# Patient Record
Sex: MALE | Race: WHITE | NOT HISPANIC OR LATINO | Employment: OTHER | URBAN - METROPOLITAN AREA
[De-identification: names, ages, dates, MRNs, and addresses within clinical notes are randomized per-mention and may not be internally consistent; named-entity substitution may affect disease eponyms.]

---

## 2017-05-15 PROBLEM — I20.9 ANGINA, CLASS II: Status: ACTIVE | Noted: 2017-05-15

## 2018-01-19 PROBLEM — J44.1 COPD EXACERBATION: Status: ACTIVE | Noted: 2018-01-19

## 2018-01-19 PROBLEM — D62 ACUTE BLOOD LOSS ANEMIA: Status: ACTIVE | Noted: 2018-01-19

## 2018-01-19 PROBLEM — D64.9 SYMPTOMATIC ANEMIA: Status: ACTIVE | Noted: 2018-01-19

## 2018-01-19 PROBLEM — I25.10 ASCVD (ARTERIOSCLEROTIC CARDIOVASCULAR DISEASE): Status: ACTIVE | Noted: 2018-01-19

## 2018-01-20 PROBLEM — K92.1 MELENA: Status: ACTIVE | Noted: 2018-01-20

## 2018-02-08 PROBLEM — K31.811 DUODENAL HEMORRHAGE DUE TO ANGIODYSPLASIA OF DUODENUM: Status: ACTIVE | Noted: 2018-02-08

## 2018-02-22 PROBLEM — J44.9 COPD (CHRONIC OBSTRUCTIVE PULMONARY DISEASE): Status: ACTIVE | Noted: 2018-01-19

## 2018-03-14 PROBLEM — R00.1 BRADYCARDIA: Status: ACTIVE | Noted: 2018-03-14

## 2018-03-14 PROBLEM — I45.2 BIFASCICULAR BUNDLE BRANCH BLOCK: Status: ACTIVE | Noted: 2018-03-14

## 2018-03-14 PROBLEM — I44.1 SECOND DEGREE AV BLOCK: Status: ACTIVE | Noted: 2018-03-14

## 2018-11-08 ENCOUNTER — OFFICE VISIT (OUTPATIENT)
Dept: OPTOMETRY | Facility: CLINIC | Age: 83
End: 2018-11-08
Payer: MEDICARE

## 2018-11-08 DIAGNOSIS — H52.13 MYOPIA OF BOTH EYES WITH REGULAR ASTIGMATISM: ICD-10-CM

## 2018-11-08 DIAGNOSIS — Z96.1 PSEUDOPHAKIA OF BOTH EYES: ICD-10-CM

## 2018-11-08 DIAGNOSIS — Z13.5 GLAUCOMA SCREENING: ICD-10-CM

## 2018-11-08 DIAGNOSIS — H35.30 AMD (AGE RELATED MACULAR DEGENERATION): Primary | ICD-10-CM

## 2018-11-08 DIAGNOSIS — H52.223 MYOPIA OF BOTH EYES WITH REGULAR ASTIGMATISM: ICD-10-CM

## 2018-11-08 PROCEDURE — 92004 COMPRE OPH EXAM NEW PT 1/>: CPT | Mod: S$PBB,,, | Performed by: OPTOMETRIST

## 2018-11-08 PROCEDURE — 99999 PR PBB SHADOW E&M-EST. PATIENT-LVL III: CPT | Mod: PBBFAC,,, | Performed by: OPTOMETRIST

## 2018-11-08 PROCEDURE — 99213 OFFICE O/P EST LOW 20 MIN: CPT | Mod: PBBFAC | Performed by: OPTOMETRIST

## 2018-11-08 PROCEDURE — 92015 DETERMINE REFRACTIVE STATE: CPT | Mod: ,,, | Performed by: OPTOMETRIST

## 2018-11-08 NOTE — PROGRESS NOTES
HPI     New pt    Retina in [place]    Macula Degeneration.  Stopped AREDs 2/2 no improvement    Gtts: Systane prn OU    Patient comes in today with complaints of deem va.   Pt noticed vision changes 3 months ago. He denies any pain, no f/f.    Last edited by Greyson Phillips, OD on 11/8/2018  8:48 AM. (History)            Assessment /Plan     For exam results, see Encounter Report.    AMD (age related macular degeneration)  -     Ambulatory Referral to Ophthalmology  -Hx of injections, wishes to establish care retina [facility]  -restart AREDs vitamins BID    Pseudophakia of both eyes  -clear, centered    Glaucoma screening  -Monitor with annual eye exam and IOP check    Myopia of both eyes with regular astigmatism  Eyeglass Final Rx     Eyeglass Final Rx       Sphere Cylinder Axis Dist VA Add    Right -1.75 +0.75 025 20/70 +2.75    Left -0.50 +1.25 155 20/40-- +2.75    Type:  Bifocal    Expiration Date:  11/9/2019                  RTC 1 yr

## 2019-02-21 ENCOUNTER — OFFICE VISIT (OUTPATIENT)
Dept: URGENT CARE | Facility: CLINIC | Age: 84
End: 2019-02-21
Payer: MEDICARE

## 2019-02-21 VITALS
SYSTOLIC BLOOD PRESSURE: 126 MMHG | DIASTOLIC BLOOD PRESSURE: 64 MMHG | RESPIRATION RATE: 20 BRPM | HEART RATE: 83 BPM | HEIGHT: 70 IN | WEIGHT: 182 LBS | OXYGEN SATURATION: 99 % | TEMPERATURE: 97 F | BODY MASS INDEX: 26.05 KG/M2

## 2019-02-21 DIAGNOSIS — R06.02 SHORTNESS OF BREATH: Primary | ICD-10-CM

## 2019-02-21 PROCEDURE — 3074F PR MOST RECENT SYSTOLIC BLOOD PRESSURE < 130 MM HG: ICD-10-PCS | Mod: CPTII,S$GLB,, | Performed by: EMERGENCY MEDICINE

## 2019-02-21 PROCEDURE — 1101F PT FALLS ASSESS-DOCD LE1/YR: CPT | Mod: CPTII,S$GLB,, | Performed by: EMERGENCY MEDICINE

## 2019-02-21 PROCEDURE — 99214 OFFICE O/P EST MOD 30 MIN: CPT | Mod: S$GLB,,, | Performed by: EMERGENCY MEDICINE

## 2019-02-21 PROCEDURE — 3078F PR MOST RECENT DIASTOLIC BLOOD PRESSURE < 80 MM HG: ICD-10-PCS | Mod: CPTII,S$GLB,, | Performed by: EMERGENCY MEDICINE

## 2019-02-21 PROCEDURE — 3078F DIAST BP <80 MM HG: CPT | Mod: CPTII,S$GLB,, | Performed by: EMERGENCY MEDICINE

## 2019-02-21 PROCEDURE — 1101F PR PT FALLS ASSESS DOC 0-1 FALLS W/OUT INJ PAST YR: ICD-10-PCS | Mod: CPTII,S$GLB,, | Performed by: EMERGENCY MEDICINE

## 2019-02-21 PROCEDURE — 3074F SYST BP LT 130 MM HG: CPT | Mod: CPTII,S$GLB,, | Performed by: EMERGENCY MEDICINE

## 2019-02-21 PROCEDURE — 99214 PR OFFICE/OUTPT VISIT, EST, LEVL IV, 30-39 MIN: ICD-10-PCS | Mod: S$GLB,,, | Performed by: EMERGENCY MEDICINE

## 2019-02-21 RX ORDER — TRAZODONE HYDROCHLORIDE 100 MG/1
200 TABLET ORAL NIGHTLY
Refills: 3 | COMMUNITY
Start: 2019-02-05 | End: 2020-05-15 | Stop reason: SDUPTHER

## 2019-02-21 RX ORDER — AMLODIPINE BESYLATE 2.5 MG/1
TABLET ORAL
Status: ON HOLD | COMMUNITY
End: 2019-04-10 | Stop reason: HOSPADM

## 2019-02-21 RX ORDER — ALPRAZOLAM 0.5 MG/1
0.5 TABLET ORAL 2 TIMES DAILY
Refills: 0 | COMMUNITY
Start: 2019-02-20 | End: 2020-01-15

## 2019-02-21 RX ORDER — AMOXICILLIN 500 MG/1
500 CAPSULE ORAL 3 TIMES DAILY
Refills: 0 | Status: ON HOLD | COMMUNITY
Start: 2019-02-20 | End: 2019-04-10 | Stop reason: HOSPADM

## 2019-02-21 RX ORDER — DILTIAZEM HYDROCHLORIDE 60 MG/1
TABLET, FILM COATED ORAL
Status: ON HOLD | COMMUNITY
End: 2019-10-17 | Stop reason: CLARIF

## 2019-02-21 RX ORDER — CLINDAMYCIN PHOSPHATE 1 G/10ML
GEL TOPICAL
Status: ON HOLD | COMMUNITY
End: 2019-10-17 | Stop reason: CLARIF

## 2019-02-21 NOTE — PROGRESS NOTES
"Subjective:       Patient ID: Kemar Perez is a 84 y.o. male.    Vitals:  height is 5' 10" (1.778 m) and weight is 82.6 kg (182 lb). His oral temperature is 97 °F (36.1 °C). His blood pressure is 126/64 and his pulse is 83. His respiration is 20 and oxygen saturation is 99%.     Chief Complaint: Shortness of Breath    Pt states years long hx shortness of breath, occurs now when he wakes up in the morning, saw cardiology last week, cxr done 2 d ago clear, trying to make appt with pulmonology.      Shortness of Breath   This is a new problem. The current episode started more than 1 year ago. The problem occurs constantly. The problem has been unchanged. Associated symptoms include wheezing. Pertinent negatives include no ear pain, fever, hemoptysis, rash, sore throat, sputum production or vomiting. The symptoms are aggravated by any activity (when patient first wakes up). He has tried ipratropium inhalers (breathing treatments) for the symptoms. His past medical history is significant for COPD.       Constitution: Negative for chills, sweating, fatigue and fever.   HENT: Negative for ear pain, congestion, sinus pain, sinus pressure, sore throat and voice change.    Neck: Negative for painful lymph nodes.   Eyes: Negative for eye redness.   Respiratory: Positive for shortness of breath and wheezing. Negative for chest tightness, cough, sputum production, bloody sputum, COPD, stridor and asthma.    Gastrointestinal: Negative for nausea and vomiting.   Musculoskeletal: Negative for muscle ache.   Skin: Negative for rash.   Allergic/Immunologic: Negative for seasonal allergies and asthma.   Hematologic/Lymphatic: Negative for swollen lymph nodes.       Objective:      Physical Exam   Constitutional: He is oriented to person, place, and time. He appears well-developed and well-nourished.   HENT:   Head: Normocephalic and atraumatic.   Cardiovascular: Normal rate, regular rhythm and normal heart sounds. "   Pulmonary/Chest:   Diminished BS bilat posteriorly, no wheezing heard   Musculoskeletal: Normal range of motion.   Neurological: He is alert and oriented to person, place, and time.   Psychiatric: He has a normal mood and affect. His behavior is normal.       Assessment:       1. Shortness of breath        Plan:         Shortness of breath        Jax Hinojosa MD  Go to the Emergency Department for any problems  Call your PCP for follow up next available.

## 2019-02-21 NOTE — PATIENT INSTRUCTIONS
Follow up with your medical team.    Jax Hinojosa MD  Go to the Emergency Department for any problems  Call your PCP for follow up next available.    Shortness of Breath (Dyspnea)  Shortness of breath is the feeling that you can't catch your breath or get enough air. It is also known as dyspnea.  Dyspnea can be caused by many different conditions. They include:  · Acute asthma attack.  · Worsening of chronic lung diseases such as chronic bronchitis and emphysema.  · Heart failure. This is when weak heart muscle allows extra fluid to collect in the lungs.  · Panic attacks or anxiety. Fear can cause rapid breathing (hyperventilation).  · Pneumonia, or an infection in the lung tissue.  · Exposure to toxic substances, fumes, smoke, or certain medicines.  · Blood clot in the lung (pulmonary embolism). This is often from a piece of blood clot in a deep vein of the leg (deep vein thrombosis) that breaks off and travels to the lungs.  · Heart attack or heart-related chest pain (angina).  · Anemia.  · Collapsed lung (pneumothorax).  · Dehydration.  · Pregnancy.  Based on your visit today, the exact cause of your shortness of breath is not certain. Your tests dont show any of the serious causes of dyspnea. You may need other tests to find out if you have a serious problem. Its important to watch for any new symptoms or symptoms that get worse. Follow up with your healthcare provider as directed.  Home care  Follow these tips to take care of yourself at home:  · When your symptoms are better, go back to your usual activities.  · If you smoke, you should stop. Join a quit-smoking program or ask your healthcare provider for help.  · Eat a healthy diet and get plenty of sleep.  · Get regular exercise. Talk with your healthcare provider before starting to exercise, especially if you have other medical problems.  · Cut down on the amount of caffeine and stimulants you consume.  Follow-up care  Follow up with your healthcare  provider, or as advised.  If tests were done, you will be told if your treatment needs to be changed. You can call as directed for the results.  (Note: If an X-ray was taken, a specialist will review it. You will be notified of any new findings that may affect your care.)  Call 911 or get immediate medical care  Shortness of breath may be a sign of a serious medical problem. For example, it may be a problem with your heart or lungs. Call 911 if you have worsening shortness of breath or trouble breathing, especially with any of the symptoms below:  · You are confused or its difficult to wake you.  · You faint or lose consciousness.  · You have a fast heartbeat, or your heartbeat is irregular.  · You are coughing up blood.  · You have pain in your chest, arm, shoulder, neck, or upper back.  · You break out in a sweat.  When to seek medical advice  Call your healthcare provider right away if any of these occur:  · Slight shortness of breath or wheezing  · Redness, pain or swelling in your leg, arm, or other body area  · Swelling in both legs or ankles  · Fast weight gain  · Dizziness or weakness  · Fever of 100.4ºF (38ºC) or higher, or as directed by your healthcare provider  Date Last Reviewed: 9/13/2015  © 1353-4884 [employer]. [address] [place] [address], [place] [address]. All rights reserved. This information is not intended as a substitute for professional medical care. Always follow your healthcare professional's instructions.

## 2019-02-24 ENCOUNTER — TELEPHONE (OUTPATIENT)
Dept: URGENT CARE | Facility: CLINIC | Age: 84
End: 2019-02-24

## 2019-03-11 DIAGNOSIS — J44.89 OBSTRUCTIVE CHRONIC BRONCHITIS WITHOUT EXACERBATION: Primary | ICD-10-CM

## 2019-03-15 ENCOUNTER — HOSPITAL ENCOUNTER (OUTPATIENT)
Dept: PULMONOLOGY | Facility: HOSPITAL | Age: 84
Discharge: HOME | End: 2019-03-15
Attending: INTERNAL MEDICINE
Payer: MEDICARE

## 2019-03-15 DIAGNOSIS — J44.89 OBSTRUCTIVE CHRONIC BRONCHITIS WITHOUT EXACERBATION: ICD-10-CM

## 2019-03-15 PROCEDURE — 94729 DIFFUSING CAPACITY: CPT

## 2019-03-15 PROCEDURE — 94060 EVALUATION OF WHEEZING: CPT

## 2019-03-15 PROCEDURE — 99900031 HC PATIENT EDUCATION (STAT)

## 2019-03-15 PROCEDURE — 94727 GAS DIL/WSHOT DETER LNG VOL: CPT

## 2019-04-06 PROBLEM — I35.0 NONRHEUMATIC AORTIC VALVE STENOSIS: Status: ACTIVE | Noted: 2019-04-06

## 2019-04-24 PROBLEM — R53.83 FATIGUE: Status: ACTIVE | Noted: 2019-04-24

## 2019-04-24 PROBLEM — D64.9 ANEMIA: Status: ACTIVE | Noted: 2019-04-24

## 2019-04-25 PROBLEM — D50.0 IRON DEFICIENCY ANEMIA DUE TO CHRONIC BLOOD LOSS: Status: ACTIVE | Noted: 2019-04-25

## 2019-06-20 ENCOUNTER — HOSPITAL ENCOUNTER (EMERGENCY)
Facility: HOSPITAL | Age: 84
Discharge: HOME | End: 2019-06-20
Attending: SURGERY
Payer: MEDICARE

## 2019-06-20 VITALS
HEART RATE: 66 BPM | OXYGEN SATURATION: 100 % | RESPIRATION RATE: 20 BRPM | SYSTOLIC BLOOD PRESSURE: 166 MMHG | DIASTOLIC BLOOD PRESSURE: 66 MMHG | TEMPERATURE: 97 F

## 2019-06-20 DIAGNOSIS — R06.00 DYSPNEA: ICD-10-CM

## 2019-06-20 LAB
ALBUMIN SERPL BCP-MCNC: 3.4 G/DL (ref 3.5–5.2)
ALP SERPL-CCNC: 89 U/L (ref 55–135)
ALT SERPL W/O P-5'-P-CCNC: 13 U/L (ref 10–44)
ANION GAP SERPL CALC-SCNC: 6 MMOL/L (ref 8–16)
AST SERPL-CCNC: 11 U/L (ref 10–40)
BASOPHILS # BLD AUTO: 0.01 K/UL (ref 0–0.2)
BASOPHILS NFR BLD: 0.2 % (ref 0–1.9)
BILIRUB SERPL-MCNC: 0.3 MG/DL (ref 0.1–1)
BNP SERPL-MCNC: 509 PG/ML (ref 0–99)
BUN SERPL-MCNC: 12 MG/DL (ref 8–23)
CALCIUM SERPL-MCNC: 9.1 MG/DL (ref 8.7–10.5)
CHLORIDE SERPL-SCNC: 105 MMOL/L (ref 95–110)
CK MB SERPL-MCNC: 2.2 NG/ML (ref 0.1–6.5)
CK MB SERPL-RTO: 3.1 % (ref 0–5)
CK SERPL-CCNC: 70 U/L (ref 20–200)
CO2 SERPL-SCNC: 29 MMOL/L (ref 23–29)
CREAT SERPL-MCNC: 0.9 MG/DL (ref 0.5–1.4)
DIFFERENTIAL METHOD: ABNORMAL
EOSINOPHIL # BLD AUTO: 0.2 K/UL (ref 0–0.5)
EOSINOPHIL NFR BLD: 3.1 % (ref 0–8)
ERYTHROCYTE [DISTWIDTH] IN BLOOD BY AUTOMATED COUNT: 17.1 % (ref 11.5–14.5)
EST. GFR  (AFRICAN AMERICAN): >60 ML/MIN/1.73 M^2
EST. GFR  (NON AFRICAN AMERICAN): >60 ML/MIN/1.73 M^2
GLUCOSE SERPL-MCNC: 105 MG/DL (ref 70–110)
HCT VFR BLD AUTO: 32.2 % (ref 40–54)
HGB BLD-MCNC: 9 G/DL (ref 14–18)
LYMPHOCYTES # BLD AUTO: 1 K/UL (ref 1–4.8)
LYMPHOCYTES NFR BLD: 16.3 % (ref 18–48)
MCH RBC QN AUTO: 22.4 PG (ref 27–31)
MCHC RBC AUTO-ENTMCNC: 28 G/DL (ref 32–36)
MCV RBC AUTO: 80 FL (ref 82–98)
MONOCYTES # BLD AUTO: 0.5 K/UL (ref 0.3–1)
MONOCYTES NFR BLD: 7.8 % (ref 4–15)
NEUTROPHILS # BLD AUTO: 4.7 K/UL (ref 1.8–7.7)
NEUTROPHILS NFR BLD: 72.8 % (ref 38–73)
PLATELET # BLD AUTO: 253 K/UL (ref 150–350)
PMV BLD AUTO: 11.4 FL (ref 9.2–12.9)
POTASSIUM SERPL-SCNC: 4.3 MMOL/L (ref 3.5–5.1)
PROT SERPL-MCNC: 6.5 G/DL (ref 6–8.4)
RBC # BLD AUTO: 4.02 M/UL (ref 4.6–6.2)
SODIUM SERPL-SCNC: 140 MMOL/L (ref 136–145)
TROPONIN I SERPL DL<=0.01 NG/ML-MCNC: 0.01 NG/ML (ref 0–0.03)
WBC # BLD AUTO: 6.4 K/UL (ref 3.9–12.7)

## 2019-06-20 PROCEDURE — 93010 EKG 12-LEAD: ICD-10-PCS | Mod: ,,, | Performed by: INTERNAL MEDICINE

## 2019-06-20 PROCEDURE — 82550 ASSAY OF CK (CPK): CPT

## 2019-06-20 PROCEDURE — 93005 ELECTROCARDIOGRAM TRACING: CPT

## 2019-06-20 PROCEDURE — 93010 ELECTROCARDIOGRAM REPORT: CPT | Mod: ,,, | Performed by: INTERNAL MEDICINE

## 2019-06-20 PROCEDURE — 82553 CREATINE MB FRACTION: CPT

## 2019-06-20 PROCEDURE — 99285 EMERGENCY DEPT VISIT HI MDM: CPT | Mod: 25

## 2019-06-20 PROCEDURE — 85025 COMPLETE CBC W/AUTO DIFF WBC: CPT

## 2019-06-20 PROCEDURE — 80053 COMPREHEN METABOLIC PANEL: CPT

## 2019-06-20 PROCEDURE — 84484 ASSAY OF TROPONIN QUANT: CPT

## 2019-06-20 PROCEDURE — 94640 AIRWAY INHALATION TREATMENT: CPT

## 2019-06-20 PROCEDURE — 83880 ASSAY OF NATRIURETIC PEPTIDE: CPT

## 2019-06-20 PROCEDURE — 96374 THER/PROPH/DIAG INJ IV PUSH: CPT

## 2019-06-20 PROCEDURE — 63600175 PHARM REV CODE 636 W HCPCS: Performed by: SURGERY

## 2019-06-20 PROCEDURE — 25000242 PHARM REV CODE 250 ALT 637 W/ HCPCS: Performed by: SURGERY

## 2019-06-20 RX ORDER — IPRATROPIUM BROMIDE AND ALBUTEROL SULFATE 2.5; .5 MG/3ML; MG/3ML
3 SOLUTION RESPIRATORY (INHALATION) EVERY 4 HOURS
Status: DISCONTINUED | OUTPATIENT
Start: 2019-06-20 | End: 2019-06-20 | Stop reason: HOSPADM

## 2019-06-20 RX ORDER — FUROSEMIDE 10 MG/ML
40 INJECTION INTRAMUSCULAR; INTRAVENOUS
Status: COMPLETED | OUTPATIENT
Start: 2019-06-20 | End: 2019-06-20

## 2019-06-20 RX ORDER — FUROSEMIDE 10 MG/ML
40 INJECTION INTRAMUSCULAR; INTRAVENOUS
Status: DISCONTINUED | OUTPATIENT
Start: 2019-06-20 | End: 2019-06-20

## 2019-06-20 RX ORDER — FUROSEMIDE 20 MG/1
20 TABLET ORAL DAILY
Qty: 15 TABLET | Refills: 0 | Status: ON HOLD | OUTPATIENT
Start: 2019-06-20 | End: 2019-10-19 | Stop reason: HOSPADM

## 2019-06-20 RX ADMIN — FUROSEMIDE 40 MG: 10 INJECTION, SOLUTION INTRAMUSCULAR; INTRAVENOUS at 10:06

## 2019-06-20 RX ADMIN — IPRATROPIUM BROMIDE AND ALBUTEROL SULFATE 3 ML: .5; 3 SOLUTION RESPIRATORY (INHALATION) at 11:06

## 2019-06-20 NOTE — ED PROVIDER NOTES
Encounter Date: 6/20/2019       History     Chief Complaint   Patient presents with    Shortness of Breath     Patient is 84-year-old white male with chronic symptomatic iron deficiency anemia.  Extensive workup to include GI Medicine has failed to identify source of his chronic anemia.  He feels short of breath today, similar to what happens when his hemoglobin is decreased to a certain level.  He presents to have blood work for evaluation and to determine if he might need transfusion, which has happened in the past.  He denies chest pain. He denies coughing.        Review of patient's allergies indicates:   Allergen Reactions    Dexamethasone Other (See Comments)     High blood pressure    Mobic [meloxicam] Other (See Comments)     Bleeds easily    Nsaids (non-steroidal anti-inflammatory drug) Other (See Comments)     Bleeding     Spiriva respimat [tiotropium bromide] Other (See Comments)     Dry mouth     Past Medical History:   Diagnosis Date    Adenomatous polyps     Anemia     Aortic stenosis     Arthritis     AS (aortic stenosis)     Asthma     BBB (bundle branch block)     Cataract     COPD (chronic obstructive pulmonary disease)     Coronary artery disease     Encounter for blood transfusion     GERD (gastroesophageal reflux disease)     GI bleed     H/O food poisoning     H/O: GI bleed     Heart block AV second degree     Hemorrhoids     History of shingles     History of stomach ulcers     HLD (hyperlipidemia)     Hx of migraines     Hx of migraines     Hypertension     Macular degeneration of right eye     PAF (paroxysmal atrial fibrillation)     Pneumonia      Past Surgical History:   Procedure Laterality Date    ANGIOGRAM-CORONARY N/A 5/15/2017    [facility]    CARDIAC ANGIOGRAM WITH STENTS      CARDIAC PACEMAKER PLACEMENT      CATARACT EXTRACTION      CATARACT SX Bilateral     COLONOSCOPY N/A 4/9/2019    Performed by Sebastien ATKINSON  MD [facility] at [facility]    CORONARY ARTERY BYPASS GRAFT      CTR Right     EGD (ESOPHAGOGASTRODUODENOSCOPY) N/A 2019    Per[facility]orthy [facility] at [facility]    ESOPHAGOGASTRODUODENOSCOPY (EGD) N/A 2018    Per[facility] at [facility]    ESOPHAGOGASTRODUODENOSCOPY (EGD) N/A 2018    Performed by [facility] at [facility]    Insertion-Pacemaker N/A 3/14/2018    Per[facility] Denys Macario [facility] at [facility]    JOINT REPLACEMENT      TOTAL KNEE ARTHROPLASTY Right     WRIST JOINT REMOVAL Right      Family History   Problem Relation Age of Onset    Heart disease Paternal Grandmother     Hypertension Paternal Grandmother     Heart disease Paternal Grandfather     Hypertension Paternal Grandfather     Heart disease Father     Hypertension Father      Social History     Tobacco Use    Smoking status: Former Smoker     Packs/day: 1.00     Types: Cigarettes     Start date:      Last attempt to quit:      Years since quittin.4    Smokeless tobacco: Never Used   Substance Use Topics    Alcohol use: No    Drug use: No     Review of Systems   Constitutional: Negative for fever.   HENT: Negative for sore throat.    Respiratory: Positive for shortness of breath. Negative for cough.    Cardiovascular: Negative for chest pain.   Gastrointestinal: Negative for nausea.   Genitourinary: Negative for dysuria.   Musculoskeletal: Negative for back pain.   Skin: Negative for rash.   Neurological: Negative for weakness.   Hematological: Does not bruise/bleed easily.       Physical Exam     Initial Vitals [19 0837]   BP Pulse Resp Temp SpO2   122/63 76 20 96.5 °F (35.8 °C) (!) 94 %      MAP       --         Physical Exam    Nursing note and vitals reviewed.  Constitutional: He appears well-developed and well-nourished.   HENT:   Head: Normocephalic and atraumatic.   Eyes: EOM are normal. Pupils are equal, round, and reactive to light.    Neck: Normal range of motion. Neck supple.   Cardiovascular: Normal rate and normal heart sounds.   Pulmonary/Chest: Breath sounds normal. He exhibits no tenderness.   Abdominal: Soft. He exhibits no distension and no mass. There is no tenderness.   Musculoskeletal: Normal range of motion.   Neurological: He is alert and oriented to person, place, and time.   Skin: Skin is warm and dry.   Psychiatric: He has a normal mood and affect. Thought content normal.         ED Course   Procedures  Labs Reviewed - No data to display       Imaging Results    None        Hct 32.  Cardiac enzymes NEGATIVE.   EKG  Unchanged since prior EKG in April.                       Clinical Impression:       ICD-10-CM ICD-9-CM   1. Dyspnea R06.00 786.09         Disposition:   Disposition: Discharged  Condition: Stable                        Jair Adam Jr., MD  06/20/19 5030

## 2019-06-20 NOTE — ED TRIAGE NOTES
"85 yo male presents to ED for shortness of breath, progressive onset 1 week.  Patient reports "when my blood gets low, I get really short of breath".  Patient on home O2 at 2LPM by nasal canula.  "

## 2019-08-27 ENCOUNTER — LAB VISIT (OUTPATIENT)
Dept: LAB | Facility: HOSPITAL | Age: 84
End: 2019-08-27
Attending: INTERNAL MEDICINE
Payer: MEDICARE

## 2019-08-27 DIAGNOSIS — I50.32 CHRONIC DIASTOLIC HEART FAILURE: Primary | ICD-10-CM

## 2019-08-27 LAB
ALBUMIN SERPL BCP-MCNC: 3.9 G/DL (ref 3.5–5.2)
ALP SERPL-CCNC: 77 U/L (ref 55–135)
ALT SERPL W/O P-5'-P-CCNC: 11 U/L (ref 10–44)
ANION GAP SERPL CALC-SCNC: 14 MMOL/L (ref 8–16)
AST SERPL-CCNC: 19 U/L (ref 10–40)
BASOPHILS # BLD AUTO: 0.01 K/UL (ref 0–0.2)
BASOPHILS NFR BLD: 0.1 % (ref 0–1.9)
BILIRUB DIRECT SERPL-MCNC: 0.2 MG/DL (ref 0.1–0.3)
BILIRUB SERPL-MCNC: 0.5 MG/DL (ref 0.1–1)
BNP SERPL-MCNC: 98 PG/ML (ref 0–99)
BUN SERPL-MCNC: 32 MG/DL (ref 8–23)
CALCIUM SERPL-MCNC: 9.6 MG/DL (ref 8.7–10.5)
CHLORIDE SERPL-SCNC: 96 MMOL/L (ref 95–110)
CHOLEST SERPL-MCNC: 167 MG/DL (ref 120–199)
CHOLEST/HDLC SERPL: 2.7 {RATIO} (ref 2–5)
CO2 SERPL-SCNC: 30 MMOL/L (ref 23–29)
CREAT SERPL-MCNC: 1.3 MG/DL (ref 0.5–1.4)
DIFFERENTIAL METHOD: ABNORMAL
EOSINOPHIL # BLD AUTO: 0.3 K/UL (ref 0–0.5)
EOSINOPHIL NFR BLD: 4.2 % (ref 0–8)
ERYTHROCYTE [DISTWIDTH] IN BLOOD BY AUTOMATED COUNT: 15.5 % (ref 11.5–14.5)
EST. GFR  (AFRICAN AMERICAN): 58 ML/MIN/1.73 M^2
EST. GFR  (NON AFRICAN AMERICAN): 50 ML/MIN/1.73 M^2
GLUCOSE SERPL-MCNC: 111 MG/DL (ref 70–110)
HCT VFR BLD AUTO: 34.7 % (ref 40–54)
HDLC SERPL-MCNC: 63 MG/DL (ref 40–75)
HDLC SERPL: 37.7 % (ref 20–50)
HGB BLD-MCNC: 10.4 G/DL (ref 14–18)
IMM GRANULOCYTES # BLD AUTO: 0.03 K/UL (ref 0–0.04)
IMM GRANULOCYTES NFR BLD AUTO: 0.4 % (ref 0–0.5)
LDLC SERPL CALC-MCNC: 93.8 MG/DL (ref 63–159)
LYMPHOCYTES # BLD AUTO: 2.2 K/UL (ref 1–4.8)
LYMPHOCYTES NFR BLD: 27.2 % (ref 18–48)
MCH RBC QN AUTO: 25.2 PG (ref 27–31)
MCHC RBC AUTO-ENTMCNC: 30 G/DL (ref 32–36)
MCV RBC AUTO: 84 FL (ref 82–98)
MONOCYTES # BLD AUTO: 0.6 K/UL (ref 0.3–1)
MONOCYTES NFR BLD: 8.1 % (ref 4–15)
NEUTROPHILS # BLD AUTO: 4.8 K/UL (ref 1.8–7.7)
NEUTROPHILS NFR BLD: 60 % (ref 38–73)
NONHDLC SERPL-MCNC: 104 MG/DL
NRBC BLD-RTO: 0 /100 WBC
PLATELET # BLD AUTO: 259 K/UL (ref 150–350)
PMV BLD AUTO: 11.9 FL (ref 9.2–12.9)
POTASSIUM SERPL-SCNC: 3.1 MMOL/L (ref 3.5–5.1)
PROT SERPL-MCNC: 7.1 G/DL (ref 6–8.4)
RBC # BLD AUTO: 4.12 M/UL (ref 4.6–6.2)
SODIUM SERPL-SCNC: 140 MMOL/L (ref 136–145)
TRIGL SERPL-MCNC: 51 MG/DL (ref 30–150)
WBC # BLD AUTO: 7.93 K/UL (ref 3.9–12.7)

## 2019-08-27 PROCEDURE — 82248 BILIRUBIN DIRECT: CPT

## 2019-08-27 PROCEDURE — 83880 ASSAY OF NATRIURETIC PEPTIDE: CPT

## 2019-08-27 PROCEDURE — 80053 COMPREHEN METABOLIC PANEL: CPT

## 2019-08-27 PROCEDURE — 80061 LIPID PANEL: CPT

## 2019-08-27 PROCEDURE — 85025 COMPLETE CBC W/AUTO DIFF WBC: CPT

## 2019-09-11 ENCOUNTER — LAB VISIT (OUTPATIENT)
Dept: LAB | Facility: HOSPITAL | Age: 84
End: 2019-09-11
Attending: INTERNAL MEDICINE
Payer: MEDICARE

## 2019-09-11 DIAGNOSIS — I25.10 CVD (CARDIOVASCULAR DISEASE): Primary | ICD-10-CM

## 2019-09-11 LAB
ANION GAP SERPL CALC-SCNC: 15 MMOL/L (ref 8–16)
BUN SERPL-MCNC: 26 MG/DL (ref 8–23)
CALCIUM SERPL-MCNC: 9.2 MG/DL (ref 8.7–10.5)
CHLORIDE SERPL-SCNC: 95 MMOL/L (ref 95–110)
CO2 SERPL-SCNC: 28 MMOL/L (ref 23–29)
CREAT SERPL-MCNC: 1.3 MG/DL (ref 0.5–1.4)
EST. GFR  (AFRICAN AMERICAN): 58 ML/MIN/1.73 M^2
EST. GFR  (NON AFRICAN AMERICAN): 50 ML/MIN/1.73 M^2
GLUCOSE SERPL-MCNC: 121 MG/DL (ref 70–110)
POTASSIUM SERPL-SCNC: 3 MMOL/L (ref 3.5–5.1)
SODIUM SERPL-SCNC: 138 MMOL/L (ref 136–145)

## 2019-09-11 PROCEDURE — 80048 BASIC METABOLIC PNL TOTAL CA: CPT

## 2019-10-09 ENCOUNTER — OFFICE VISIT (OUTPATIENT)
Dept: GASTROENTEROLOGY | Facility: CLINIC | Age: 84
End: 2019-10-09
Payer: MEDICARE

## 2019-10-09 VITALS
SYSTOLIC BLOOD PRESSURE: 122 MMHG | BODY MASS INDEX: 24.28 KG/M2 | WEIGHT: 174.13 LBS | DIASTOLIC BLOOD PRESSURE: 78 MMHG | HEART RATE: 70 BPM

## 2019-10-09 DIAGNOSIS — K21.9 GASTROESOPHAGEAL REFLUX DISEASE, ESOPHAGITIS PRESENCE NOT SPECIFIED: ICD-10-CM

## 2019-10-09 DIAGNOSIS — D50.0 IRON DEFICIENCY ANEMIA DUE TO CHRONIC BLOOD LOSS: ICD-10-CM

## 2019-10-09 DIAGNOSIS — K59.04 CHRONIC IDIOPATHIC CONSTIPATION: ICD-10-CM

## 2019-10-09 DIAGNOSIS — R10.84 ABDOMINAL PAIN, GENERALIZED: Primary | ICD-10-CM

## 2019-10-09 PROBLEM — D64.9 ANEMIA: Status: RESOLVED | Noted: 2019-04-24 | Resolved: 2019-10-09

## 2019-10-09 PROBLEM — K92.1 MELENA: Status: RESOLVED | Noted: 2018-01-20 | Resolved: 2019-10-09

## 2019-10-09 PROBLEM — D62 ACUTE BLOOD LOSS ANEMIA: Status: RESOLVED | Noted: 2018-01-19 | Resolved: 2019-10-09

## 2019-10-09 PROBLEM — D64.9 SYMPTOMATIC ANEMIA: Status: RESOLVED | Noted: 2018-01-19 | Resolved: 2019-10-09

## 2019-10-09 PROCEDURE — 99204 OFFICE O/P NEW MOD 45 MIN: CPT | Mod: S$PBB,,, | Performed by: INTERNAL MEDICINE

## 2019-10-09 PROCEDURE — 99204 PR OFFICE/OUTPT VISIT, NEW, LEVL IV, 45-59 MIN: ICD-10-PCS | Mod: S$PBB,,, | Performed by: INTERNAL MEDICINE

## 2019-10-09 PROCEDURE — 99999 PR PBB SHADOW E&M-EST. PATIENT-LVL III: CPT | Mod: PBBFAC,,, | Performed by: INTERNAL MEDICINE

## 2019-10-09 PROCEDURE — 99999 PR PBB SHADOW E&M-EST. PATIENT-LVL III: ICD-10-PCS | Mod: PBBFAC,,, | Performed by: INTERNAL MEDICINE

## 2019-10-09 PROCEDURE — 99213 OFFICE O/P EST LOW 20 MIN: CPT | Mod: PBBFAC,PO | Performed by: INTERNAL MEDICINE

## 2019-10-09 RX ORDER — ROSUVASTATIN CALCIUM 20 MG/1
20 TABLET, COATED ORAL DAILY
Refills: 11 | COMMUNITY
Start: 2019-09-16 | End: 2021-10-25 | Stop reason: DRUGHIGH

## 2019-10-09 RX ORDER — FLUTICASONE PROPIONATE 50 MCG
SPRAY, SUSPENSION (ML) NASAL
Refills: 0 | Status: ON HOLD | COMMUNITY
Start: 2019-09-14 | End: 2019-10-17 | Stop reason: CLARIF

## 2019-10-09 RX ORDER — PANTOPRAZOLE SODIUM 40 MG/1
40 TABLET, DELAYED RELEASE ORAL DAILY
Qty: 30 TABLET | Refills: 11 | Status: ON HOLD | OUTPATIENT
Start: 2019-10-09 | End: 2019-10-17 | Stop reason: CLARIF

## 2019-10-09 RX ORDER — POTASSIUM CHLORIDE 20 MEQ/1
20 TABLET, EXTENDED RELEASE ORAL 2 TIMES DAILY
Refills: 11 | COMMUNITY
Start: 2019-09-13

## 2019-10-09 RX ORDER — ROPINIROLE 1 MG/1
TABLET, FILM COATED ORAL
Status: ON HOLD | COMMUNITY
End: 2020-01-16 | Stop reason: SDUPTHER

## 2019-10-09 RX ORDER — POLYETHYLENE GLYCOL 3350 17 G/17G
17 POWDER, FOR SOLUTION ORAL DAILY
Qty: 1 BOTTLE | Refills: 11 | Status: ON HOLD | OUTPATIENT
Start: 2019-10-09 | End: 2019-10-17 | Stop reason: CLARIF

## 2019-10-09 RX ORDER — METOPROLOL SUCCINATE 25 MG/1
25 TABLET, EXTENDED RELEASE ORAL DAILY
Refills: 11 | COMMUNITY
Start: 2019-09-16

## 2019-10-09 NOTE — PROGRESS NOTES
"Subjective:       Patient ID: Kemar Perez is a 85 y.o. male.    Chief Complaint: Abdominal Pain    84 yo M complains for abdominal pain.  He states he gets generalized pain in his abdomen, but it seems worse at the top.  The pain is random but does appear improve after BM.  He has 2-3 BM per week, but each BM is a good size.  He is taking Colace 2 pills per day but is not sure if this is helping or not.  He recalls taking Miralax in the past and felt that it worked better.  He has only rare heartburn stating that the Protonix helps a lot.  He previously had all of his care in [place], but he lives in [place] and has vision issues thus would like a shorter drive.  He is moving all of his care to [facility].    He states that he continues to "lose blood" and he is not sure why.  He has oral iron at home but takes one pill a few times per week only due to intolerance of it.  Chart review shows EGD x 2 for angiectasia ablation.    Review of Systems   Constitutional: Negative for appetite change and unexpected weight change.   Eyes: Negative for photophobia and visual disturbance.   Respiratory: Positive for shortness of breath. Negative for chest tightness and wheezing.    Cardiovascular: Positive for leg swelling. Negative for chest pain and palpitations.   Genitourinary: Negative for dysuria, flank pain and hematuria.   Musculoskeletal: Negative for joint swelling and myalgias.   Skin: Negative for color change and rash.   Neurological: Negative for dizziness and speech difficulty.   Psychiatric/Behavioral: Negative for confusion and hallucinations.       Objective:       /78 (BP Location: Right arm, Patient Position: Sitting, BP Method: Large (Manual))   Pulse 70   Wt 79 kg (174 lb 1.6 oz)   BMI 24.28 kg/m²     Physical Exam   Constitutional: He is oriented to person, place, and time. He appears well-developed and well-nourished.   O2 via NC in place, pt in wheelchair   HENT:   Head: Normocephalic " and atraumatic.   Eyes: Pupils are equal, round, and reactive to light. EOM are normal.   Neck: Normal range of motion. Neck supple.   Cardiovascular: Normal rate, regular rhythm, normal heart sounds and intact distal pulses.   Pulmonary/Chest: Effort normal and breath sounds normal.   Abdominal: Soft. Bowel sounds are normal. He exhibits no distension. There is no tenderness. There is no guarding.   Musculoskeletal: He exhibits edema. He exhibits no deformity.   Neurological: He is alert and oriented to person, place, and time.   Skin: Skin is warm and dry.   Psychiatric: He has a normal mood and affect. His behavior is normal.       Lab Results   Component Value Date    WBC 7.86 09/03/2019    HGB 9.7 (L) 09/03/2019    HCT 33.1 (L) 09/03/2019    MCV 86 09/03/2019     09/03/2019     CMP  Sodium   Date Value Ref Range Status   09/11/2019 138 136 - 145 mmol/L Final     Potassium   Date Value Ref Range Status   09/11/2019 3.0 (L) 3.5 - 5.1 mmol/L Final     Chloride   Date Value Ref Range Status   09/11/2019 95 95 - 110 mmol/L Final     CO2   Date Value Ref Range Status   09/11/2019 28 23 - 29 mmol/L Final     Glucose   Date Value Ref Range Status   09/11/2019 121 (H) 70 - 110 mg/dL Final   10/30/2016 114 (H) 74 - 106 MG/DL Final     BUN, Bld   Date Value Ref Range Status   09/11/2019 26 (H) 8 - 23 mg/dL Final     Creatinine   Date Value Ref Range Status   09/11/2019 1.3 0.5 - 1.4 mg/dL Final     Calcium   Date Value Ref Range Status   09/11/2019 9.2 8.7 - 10.5 mg/dL Final     Total Protein   Date Value Ref Range Status   09/03/2019 7.2 6.0 - 8.4 g/dL Final     Albumin   Date Value Ref Range Status   09/03/2019 4.2 3.5 - 5.2 g/dL Final   10/30/2016 4.3 3.5 - 5.0 G/DL Final     Total Bilirubin   Date Value Ref Range Status   09/03/2019 0.3 0.1 - 1.0 mg/dL Final     Comment:     For infants and newborns, interpretation of results should be based  on gestational age, weight and in agreement with  clinical  observations.  Premature Infant recommended reference ranges:  Up to 24 hours.............<8.0 mg/dL  Up to 48 hours............<12.0 mg/dL  3-5 days..................<15.0 mg/dL  6-29 days.................<15.0 mg/dL       Alkaline Phosphatase   Date Value Ref Range Status   09/03/2019 82 38 - 126 U/L Final     AST   Date Value Ref Range Status   09/03/2019 23 15 - 46 U/L Final     ALT   Date Value Ref Range Status   09/03/2019 17 10 - 44 U/L Final     Anion Gap   Date Value Ref Range Status   09/11/2019 15 8 - 16 mmol/L Final     eGFR if    Date Value Ref Range Status   09/11/2019 58 (A) >60 mL/min/1.73 m^2 Final     eGFR if non    Date Value Ref Range Status   09/11/2019 50 (A) >60 mL/min/1.73 m^2 Final     Comment:     Calculation used to obtain the estimated glomerular filtration  rate (eGFR) is the CKD-EPI equation.        Lab Results   Component Value Date    IRON 17 (L) 04/25/2019    TIBC 387 04/25/2019    FERRITIN 7.9 (L) 04/25/2019     Old records from chart reviewed and summarized, significant for:  -- EGD with Dr. Worthy 1/2018:  5 angiectasias in duodenum s/p heater probe  -- EGD with Dr. Worthy 2/2018:  2 angiectasias in duodenum s/p heater probe  -- EGD with Dr. Worthy 4/2019:  1 angiectasia in stomach s/p Gold probe  -- Colonoscopy with Dr. Worthy 4/2019:  Diverticulosis sigmoid, o/w normal    CT was independently visualized and reviewed by me and showed very large amount of stool in the right and transverse colon.    Assessment:       1. Abdominal pain, generalized    2. Chronic idiopathic constipation    3. Gastroesophageal reflux disease, esophagitis presence not specified    4. Iron deficiency anemia due to chronic blood loss        Plan:       Abdominal pain, generalized        -     His pain is highly likely related to both constipation and GERD, see below    Chronic idiopathic constipation  -     polyethylene glycol (GLYCOLAX) 17 gram/dose  powder; Take 17 g by mouth once daily.  Dispense: 1 Bottle; Refill: 11    Gastroesophageal reflux disease, esophagitis presence not specified  -     pantoprazole (PROTONIX) 40 MG tablet; Take 1 tablet (40 mg total) by mouth once daily.  Dispense: 30 tablet; Refill: 11    Iron deficiency anemia due to chronic blood loss  -     Ambulatory consult to Hematology / Oncology for IV iron  -     I see that Dr. Worthy planned for VCE but when I described this to the pt, he does not recall getting it.  It is likely academic at this point as he highly likely has angiectasias throughout the small bowel, but he is a poor candidate for SBE given his cardiopulmonary status.  If his Hgb can be kept appropriate with IV iron alone, this would be the better course of action.  In addition, he and his fiance are both wheelchair bound and on oxygen, yet they both drive locally only.  Therefore getting to [place] in order to do capsule and/or SBE would be very difficult for them.

## 2019-10-09 NOTE — PATIENT INSTRUCTIONS
Abdominal Pain    Abdominal pain is pain in the stomach or belly area. Everyone has this pain from time to time. In many cases it goes away on its own. But abdominal pain can sometimes be due to a serious problem, such as appendicitis. So its important to know when to seek help.  Causes of abdominal pain  There are many possible causes of abdominal pain. Common causes in adults include:  · Constipation, diarrhea, or gas  · Stomach acid flowing back up into the esophagus (acid reflux or heartburn)  · Severe acid reflux, called GERD (gastroesophageal reflux disease)  · A sore in the lining of the stomach or small intestine (peptic ulcer)  · Inflammation of the gallbladder, liver, or pancreas  · Gallstones or kidney stones  · Appendicitis   · Intestinal blockage   · An internal organ pushing through a muscle or other tissue (hernia)  · Urinary tract infections  · In women, menstrual cramps, fibroids, or endometriosis  · Inflammation or infection of the intestines  Diagnosing the cause of abdominal pain  Your healthcare provider will do a physical exam help find the cause of your pain. If needed, tests will be ordered. Belly pain has many possible causes. So it can be hard to find the reason for your pain. Giving details about your pain can help. Tell your provider where and when you feel the pain, and what makes it better or worse. Also let your provider know if you have other symptoms such as:  · Fever  · Tiredness  · Upset stomach (nausea)  · Vomiting  · Changes in bathroom habits  Treating abdominal pain  Some causes of pain need emergency medical treatment right away. These include appendicitis or a bowel blockage. Other problems can be treated with rest, fluids, or medicines. Your healthcare provider can give you specific instructions for treatment or self-care based on what is causing your pain.  If you have vomiting or diarrhea, sip water or other clear fluids. When you are ready to eat solid foods again,  start with small amounts of easy-to-digest, low-fat foods. These include apple sauce, toast, or crackers.   When to seek medical care  Call 911 or go to the hospital right away if you:  · Cant pass stool and are vomiting  · Are vomiting blood or have bloody diarrhea or black, tarry diarrhea  · Have chest, neck, or shoulder pain  · Feel like you might pass out  · Have pain in your shoulder blades with nausea  · Have sudden, severe belly pain  · Have new, severe pain unlike any you have felt before  · Have a belly that is rigid, hard, and tender to touch  Call your healthcare provider if you have:  · Pain for more than 5 days  · Bloating for more than 2 days  · Diarrhea for more than 5 days  · A fever of 100.4°F (38.0°C) or higher, or as directed by your provider  · Pain that gets worse  · Weight loss for no reason  · Continued lack of appetite  · Blood in your stool  How to prevent abdominal pain  Here are some tips to help prevent abdominal pain:  · Eat smaller amounts of food at one time.  · Avoid greasy, fried, or other high-fat foods.  · Avoid foods that give you gas.  · Exercise regularly.  · Drink plenty of fluids.  To help prevent GERD symptoms:  · Quit smoking.  · Reduce alcohol and certain foods that increase stomach acid.  · Avoid aspirin and over-the-counter pain and fever medicines (NSAIDS or nonsteroidal anti-inflammatory drugs), if possible  · Lose extra weight.  · Finish eating at least 2 hours before you go to bed or lie down.  · Raise the head of your bed.  Date Last Reviewed: 7/1/2016  © 3403-1876 [employer]. [address] [place] [address], [place] [address]. All rights reserved. This information is not intended as a substitute for professional medical care. Always follow your healthcare professional's instructions.

## 2019-10-16 PROBLEM — K92.2 ACUTE UPPER GI BLEEDING: Status: ACTIVE | Noted: 2019-10-16

## 2019-10-17 ENCOUNTER — ANESTHESIA (OUTPATIENT)
Dept: ENDOSCOPY | Facility: HOSPITAL | Age: 84
DRG: 378 | End: 2019-10-17
Payer: MEDICARE

## 2019-10-17 ENCOUNTER — HOSPITAL ENCOUNTER (INPATIENT)
Facility: HOSPITAL | Age: 84
LOS: 2 days | Discharge: HOME | DRG: 378 | End: 2019-10-19
Attending: HOSPITALIST | Admitting: HOSPITALIST
Payer: MEDICARE

## 2019-10-17 ENCOUNTER — ANESTHESIA EVENT (OUTPATIENT)
Dept: ENDOSCOPY | Facility: HOSPITAL | Age: 84
DRG: 378 | End: 2019-10-17
Payer: MEDICARE

## 2019-10-17 DIAGNOSIS — K92.2 GI BLEED: ICD-10-CM

## 2019-10-17 DIAGNOSIS — D64.9 SYMPTOMATIC ANEMIA: Primary | ICD-10-CM

## 2019-10-17 PROBLEM — I10 ESSENTIAL HYPERTENSION: Status: ACTIVE | Noted: 2019-10-17

## 2019-10-17 PROBLEM — E78.5 HYPERLIPIDEMIA: Status: ACTIVE | Noted: 2019-10-17

## 2019-10-17 LAB
ABO + RH BLD: NORMAL
ANION GAP SERPL CALC-SCNC: 7 MMOL/L (ref 8–16)
BASOPHILS # BLD AUTO: 0.01 K/UL (ref 0–0.2)
BASOPHILS # BLD AUTO: 0.02 K/UL (ref 0–0.2)
BASOPHILS NFR BLD: 0.1 % (ref 0–1.9)
BASOPHILS NFR BLD: 0.3 % (ref 0–1.9)
BLD GP AB SCN CELLS X3 SERPL QL: NORMAL
BLD PROD TYP BPU: NORMAL
BLOOD UNIT EXPIRATION DATE: NORMAL
BLOOD UNIT TYPE CODE: 6200
BLOOD UNIT TYPE: NORMAL
BUN SERPL-MCNC: 19 MG/DL (ref 8–23)
CALCIUM SERPL-MCNC: 8.5 MG/DL (ref 8.7–10.5)
CHLORIDE SERPL-SCNC: 104 MMOL/L (ref 95–110)
CO2 SERPL-SCNC: 26 MMOL/L (ref 23–29)
CODING SYSTEM: NORMAL
CREAT SERPL-MCNC: 1 MG/DL (ref 0.5–1.4)
DIFFERENTIAL METHOD: ABNORMAL
DIFFERENTIAL METHOD: ABNORMAL
DISPENSE STATUS: NORMAL
EOSINOPHIL # BLD AUTO: 0.1 K/UL (ref 0–0.5)
EOSINOPHIL # BLD AUTO: 0.2 K/UL (ref 0–0.5)
EOSINOPHIL NFR BLD: 1.5 % (ref 0–8)
EOSINOPHIL NFR BLD: 2.3 % (ref 0–8)
ERYTHROCYTE [DISTWIDTH] IN BLOOD BY AUTOMATED COUNT: 14.1 % (ref 11.5–14.5)
ERYTHROCYTE [DISTWIDTH] IN BLOOD BY AUTOMATED COUNT: 14.3 % (ref 11.5–14.5)
EST. GFR  (AFRICAN AMERICAN): >60 ML/MIN/1.73 M^2
EST. GFR  (NON AFRICAN AMERICAN): >60 ML/MIN/1.73 M^2
GLUCOSE SERPL-MCNC: 113 MG/DL (ref 70–110)
HCT VFR BLD AUTO: 21.7 % (ref 40–54)
HCT VFR BLD AUTO: 26.4 % (ref 40–54)
HCT VFR BLD AUTO: 27.4 % (ref 40–54)
HGB BLD-MCNC: 6.4 G/DL (ref 14–18)
HGB BLD-MCNC: 8.1 G/DL (ref 14–18)
HGB BLD-MCNC: 8.5 G/DL (ref 14–18)
IMM GRANULOCYTES # BLD AUTO: 0.02 K/UL (ref 0–0.04)
IMM GRANULOCYTES # BLD AUTO: 0.04 K/UL (ref 0–0.04)
IMM GRANULOCYTES NFR BLD AUTO: 0.3 % (ref 0–0.5)
IMM GRANULOCYTES NFR BLD AUTO: 0.5 % (ref 0–0.5)
INR PPP: 1 (ref 0.8–1.2)
LYMPHOCYTES # BLD AUTO: 1.1 K/UL (ref 1–4.8)
LYMPHOCYTES # BLD AUTO: 1.2 K/UL (ref 1–4.8)
LYMPHOCYTES NFR BLD: 13.5 % (ref 18–48)
LYMPHOCYTES NFR BLD: 16.7 % (ref 18–48)
MCH RBC QN AUTO: 26.2 PG (ref 27–31)
MCH RBC QN AUTO: 27 PG (ref 27–31)
MCHC RBC AUTO-ENTMCNC: 30.7 G/DL (ref 32–36)
MCHC RBC AUTO-ENTMCNC: 31 G/DL (ref 32–36)
MCV RBC AUTO: 85 FL (ref 82–98)
MCV RBC AUTO: 87 FL (ref 82–98)
MONOCYTES # BLD AUTO: 0.5 K/UL (ref 0.3–1)
MONOCYTES # BLD AUTO: 0.6 K/UL (ref 0.3–1)
MONOCYTES NFR BLD: 7.1 % (ref 4–15)
MONOCYTES NFR BLD: 7.5 % (ref 4–15)
NEUTROPHILS # BLD AUTO: 5.2 K/UL (ref 1.8–7.7)
NEUTROPHILS # BLD AUTO: 6.1 K/UL (ref 1.8–7.7)
NEUTROPHILS NFR BLD: 73.1 % (ref 38–73)
NEUTROPHILS NFR BLD: 77.1 % (ref 38–73)
NRBC BLD-RTO: 0 /100 WBC
NRBC BLD-RTO: 0 /100 WBC
PLATELET # BLD AUTO: 254 K/UL (ref 150–350)
PLATELET # BLD AUTO: 257 K/UL (ref 150–350)
PMV BLD AUTO: 11.5 FL (ref 9.2–12.9)
PMV BLD AUTO: 11.7 FL (ref 9.2–12.9)
POTASSIUM SERPL-SCNC: 3.7 MMOL/L (ref 3.5–5.1)
PROTHROMBIN TIME: 10.8 SEC (ref 9–12.5)
RBC # BLD AUTO: 3.09 M/UL (ref 4.6–6.2)
RBC # BLD AUTO: 3.15 M/UL (ref 4.6–6.2)
SODIUM SERPL-SCNC: 137 MMOL/L (ref 136–145)
TRANS ERYTHROCYTES VOL PATIENT: NORMAL ML
WBC # BLD AUTO: 7.11 K/UL (ref 3.9–12.7)
WBC # BLD AUTO: 7.86 K/UL (ref 3.9–12.7)

## 2019-10-17 PROCEDURE — 80048 BASIC METABOLIC PNL TOTAL CA: CPT

## 2019-10-17 PROCEDURE — 25000003 PHARM REV CODE 250: Performed by: NURSE ANESTHETIST, CERTIFIED REGISTERED

## 2019-10-17 PROCEDURE — 21400001 HC TELEMETRY ROOM

## 2019-10-17 PROCEDURE — 86850 RBC ANTIBODY SCREEN: CPT

## 2019-10-17 PROCEDURE — 85018 HEMOGLOBIN: CPT

## 2019-10-17 PROCEDURE — 36415 COLL VENOUS BLD VENIPUNCTURE: CPT

## 2019-10-17 PROCEDURE — 25000003 PHARM REV CODE 250: Performed by: HOSPITALIST

## 2019-10-17 PROCEDURE — 85025 COMPLETE CBC W/AUTO DIFF WBC: CPT

## 2019-10-17 PROCEDURE — D9220A PRA ANESTHESIA: ICD-10-PCS | Mod: CRNA,,, | Performed by: NURSE ANESTHETIST, CERTIFIED REGISTERED

## 2019-10-17 PROCEDURE — 27000221 HC OXYGEN, UP TO 24 HOURS

## 2019-10-17 PROCEDURE — 94761 N-INVAS EAR/PLS OXIMETRY MLT: CPT

## 2019-10-17 PROCEDURE — 63600175 PHARM REV CODE 636 W HCPCS: Performed by: NURSE ANESTHETIST, CERTIFIED REGISTERED

## 2019-10-17 PROCEDURE — 37000009 HC ANESTHESIA EA ADD 15 MINS: Performed by: INTERNAL MEDICINE

## 2019-10-17 PROCEDURE — D9220A PRA ANESTHESIA: Mod: ANES,,, | Performed by: ANESTHESIOLOGY

## 2019-10-17 PROCEDURE — D9220A PRA ANESTHESIA: Mod: CRNA,,, | Performed by: NURSE ANESTHETIST, CERTIFIED REGISTERED

## 2019-10-17 PROCEDURE — P9021 RED BLOOD CELLS UNIT: HCPCS

## 2019-10-17 PROCEDURE — 63600175 PHARM REV CODE 636 W HCPCS: Performed by: HOSPITALIST

## 2019-10-17 PROCEDURE — 85610 PROTHROMBIN TIME: CPT

## 2019-10-17 PROCEDURE — D9220A PRA ANESTHESIA: ICD-10-PCS | Mod: ANES,,, | Performed by: ANESTHESIOLOGY

## 2019-10-17 PROCEDURE — 85014 HEMATOCRIT: CPT

## 2019-10-17 PROCEDURE — 43235 EGD DIAGNOSTIC BRUSH WASH: CPT | Performed by: INTERNAL MEDICINE

## 2019-10-17 PROCEDURE — 37000008 HC ANESTHESIA 1ST 15 MINUTES: Performed by: INTERNAL MEDICINE

## 2019-10-17 PROCEDURE — 86920 COMPATIBILITY TEST SPIN: CPT

## 2019-10-17 RX ORDER — FINASTERIDE 5 MG/1
5 TABLET, FILM COATED ORAL NIGHTLY
Status: DISCONTINUED | OUTPATIENT
Start: 2019-10-17 | End: 2019-10-17

## 2019-10-17 RX ORDER — MORPHINE SULFATE 10 MG/ML
2 INJECTION INTRAMUSCULAR; INTRAVENOUS; SUBCUTANEOUS EVERY 4 HOURS PRN
Status: DISCONTINUED | OUTPATIENT
Start: 2019-10-17 | End: 2019-10-17

## 2019-10-17 RX ORDER — ROPINIROLE 0.25 MG/1
1 TABLET, FILM COATED ORAL NIGHTLY
Status: DISCONTINUED | OUTPATIENT
Start: 2019-10-17 | End: 2019-10-19 | Stop reason: HOSPADM

## 2019-10-17 RX ORDER — PANTOPRAZOLE SODIUM 40 MG/10ML
40 INJECTION, POWDER, LYOPHILIZED, FOR SOLUTION INTRAVENOUS 2 TIMES DAILY
Status: DISCONTINUED | OUTPATIENT
Start: 2019-10-17 | End: 2019-10-17

## 2019-10-17 RX ORDER — METOPROLOL SUCCINATE 25 MG/1
25 TABLET, EXTENDED RELEASE ORAL DAILY
Status: DISCONTINUED | OUTPATIENT
Start: 2019-10-17 | End: 2019-10-19 | Stop reason: HOSPADM

## 2019-10-17 RX ORDER — PROPOFOL 10 MG/ML
VIAL (ML) INTRAVENOUS
Status: COMPLETED
Start: 2019-10-17 | End: 2019-10-17

## 2019-10-17 RX ORDER — LIDOCAINE HYDROCHLORIDE 20 MG/ML
INJECTION, SOLUTION EPIDURAL; INFILTRATION; INTRACAUDAL; PERINEURAL
Status: DISCONTINUED
Start: 2019-10-17 | End: 2019-10-17 | Stop reason: WASHOUT

## 2019-10-17 RX ORDER — SIMVASTATIN 10 MG/1
10 TABLET, FILM COATED ORAL NIGHTLY
Status: DISCONTINUED | OUTPATIENT
Start: 2019-10-17 | End: 2019-10-17

## 2019-10-17 RX ORDER — LIDOCAINE HCL/PF 100 MG/5ML
SYRINGE (ML) INTRAVENOUS
Status: DISCONTINUED | OUTPATIENT
Start: 2019-10-17 | End: 2019-10-17

## 2019-10-17 RX ORDER — SODIUM CHLORIDE 9 MG/ML
INJECTION, SOLUTION INTRAVENOUS CONTINUOUS PRN
Status: DISCONTINUED | OUTPATIENT
Start: 2019-10-17 | End: 2019-10-17

## 2019-10-17 RX ORDER — FUROSEMIDE 40 MG/1
40 TABLET ORAL DAILY
Status: DISCONTINUED | OUTPATIENT
Start: 2019-10-18 | End: 2019-10-19 | Stop reason: HOSPADM

## 2019-10-17 RX ORDER — HYDROCODONE BITARTRATE AND ACETAMINOPHEN 500; 5 MG/1; MG/1
TABLET ORAL
Status: DISCONTINUED | OUTPATIENT
Start: 2019-10-17 | End: 2019-10-19 | Stop reason: HOSPADM

## 2019-10-17 RX ORDER — ALPRAZOLAM 0.5 MG/1
0.5 TABLET ORAL 2 TIMES DAILY PRN
Status: DISCONTINUED | OUTPATIENT
Start: 2019-10-17 | End: 2019-10-19 | Stop reason: HOSPADM

## 2019-10-17 RX ORDER — GLYCOPYRROLATE 0.2 MG/ML
INJECTION INTRAMUSCULAR; INTRAVENOUS
Status: DISCONTINUED
Start: 2019-10-17 | End: 2019-10-17 | Stop reason: WASHOUT

## 2019-10-17 RX ORDER — PANTOPRAZOLE SODIUM 40 MG/1
40 TABLET, DELAYED RELEASE ORAL DAILY
Status: DISCONTINUED | OUTPATIENT
Start: 2019-10-18 | End: 2019-10-19 | Stop reason: HOSPADM

## 2019-10-17 RX ORDER — SODIUM CHLORIDE 0.9 % (FLUSH) 0.9 %
10 SYRINGE (ML) INJECTION
Status: DISCONTINUED | OUTPATIENT
Start: 2019-10-17 | End: 2019-10-19 | Stop reason: HOSPADM

## 2019-10-17 RX ORDER — TRAZODONE HYDROCHLORIDE 50 MG/1
200 TABLET ORAL NIGHTLY PRN
Status: DISCONTINUED | OUTPATIENT
Start: 2019-10-17 | End: 2019-10-19 | Stop reason: HOSPADM

## 2019-10-17 RX ORDER — ROSUVASTATIN CALCIUM 10 MG/1
20 TABLET, COATED ORAL NIGHTLY
Status: DISCONTINUED | OUTPATIENT
Start: 2019-10-17 | End: 2019-10-19 | Stop reason: HOSPADM

## 2019-10-17 RX ORDER — ACETAMINOPHEN 325 MG/1
650 TABLET ORAL EVERY 4 HOURS PRN
Status: DISCONTINUED | OUTPATIENT
Start: 2019-10-17 | End: 2019-10-19 | Stop reason: HOSPADM

## 2019-10-17 RX ORDER — LISINOPRIL 20 MG/1
20 TABLET ORAL DAILY
Status: DISCONTINUED | OUTPATIENT
Start: 2019-10-17 | End: 2019-10-19

## 2019-10-17 RX ORDER — ETOMIDATE 2 MG/ML
INJECTION INTRAVENOUS
Status: COMPLETED
Start: 2019-10-17 | End: 2019-10-17

## 2019-10-17 RX ORDER — PROCHLORPERAZINE EDISYLATE 5 MG/ML
5 INJECTION INTRAMUSCULAR; INTRAVENOUS EVERY 6 HOURS PRN
Status: DISCONTINUED | OUTPATIENT
Start: 2019-10-17 | End: 2019-10-19 | Stop reason: HOSPADM

## 2019-10-17 RX ORDER — DEXTROSE MONOHYDRATE AND SODIUM CHLORIDE 5; .9 G/100ML; G/100ML
INJECTION, SOLUTION INTRAVENOUS CONTINUOUS
Status: DISCONTINUED | OUTPATIENT
Start: 2019-10-17 | End: 2019-10-17

## 2019-10-17 RX ORDER — ISOSORBIDE MONONITRATE 30 MG/1
60 TABLET, EXTENDED RELEASE ORAL DAILY
Status: DISCONTINUED | OUTPATIENT
Start: 2019-10-18 | End: 2019-10-19 | Stop reason: HOSPADM

## 2019-10-17 RX ORDER — PROPOFOL 10 MG/ML
VIAL (ML) INTRAVENOUS
Status: DISCONTINUED | OUTPATIENT
Start: 2019-10-17 | End: 2019-10-17

## 2019-10-17 RX ORDER — FUROSEMIDE 40 MG/1
40 TABLET ORAL DAILY
COMMUNITY

## 2019-10-17 RX ORDER — HYDROCHLOROTHIAZIDE 25 MG/1
25 TABLET ORAL DAILY
Status: DISCONTINUED | OUTPATIENT
Start: 2019-10-17 | End: 2019-10-18

## 2019-10-17 RX ORDER — LIDOCAINE HYDROCHLORIDE 20 MG/ML
INJECTION, SOLUTION EPIDURAL; INFILTRATION; INTRACAUDAL; PERINEURAL
Status: DISPENSED
Start: 2019-10-17 | End: 2019-10-18

## 2019-10-17 RX ORDER — ONDANSETRON 2 MG/ML
8 INJECTION INTRAMUSCULAR; INTRAVENOUS EVERY 8 HOURS PRN
Status: DISCONTINUED | OUTPATIENT
Start: 2019-10-17 | End: 2019-10-19 | Stop reason: HOSPADM

## 2019-10-17 RX ORDER — ETOMIDATE 2 MG/ML
INJECTION INTRAVENOUS
Status: DISCONTINUED | OUTPATIENT
Start: 2019-10-17 | End: 2019-10-17

## 2019-10-17 RX ADMIN — METOPROLOL SUCCINATE 25 MG: 25 TABLET, EXTENDED RELEASE ORAL at 06:10

## 2019-10-17 RX ADMIN — LIDOCAINE HYDROCHLORIDE 60 MG: 20 INJECTION, SOLUTION INTRAVENOUS at 02:10

## 2019-10-17 RX ADMIN — PROPOFOL 50 MG: 10 INJECTION, EMULSION INTRAVENOUS at 02:10

## 2019-10-17 RX ADMIN — LISINOPRIL 20 MG: 20 TABLET ORAL at 06:10

## 2019-10-17 RX ADMIN — ETOMIDATE 8 MG: 2 INJECTION, SOLUTION INTRAVENOUS at 02:10

## 2019-10-17 RX ADMIN — HYDROCHLOROTHIAZIDE 25 MG: 25 TABLET ORAL at 06:10

## 2019-10-17 RX ADMIN — ROPINIROLE HYDROCHLORIDE 1 MG: 0.25 TABLET, FILM COATED ORAL at 08:10

## 2019-10-17 RX ADMIN — ROSUVASTATIN CALCIUM 20 MG: 10 TABLET, COATED ORAL at 08:10

## 2019-10-17 RX ADMIN — SODIUM CHLORIDE: 9 INJECTION, SOLUTION INTRAVENOUS at 02:10

## 2019-10-17 RX ADMIN — DEXTROSE AND SODIUM CHLORIDE: 5; .9 INJECTION, SOLUTION INTRAVENOUS at 04:10

## 2019-10-17 NOTE — ANESTHESIA PREPROCEDURE EVALUATION
10/17/2019  Kemar Perez is a 85 y.o., male.    Anesthesia Evaluation         Review of Systems  Anesthesia Hx:  No previous Anesthesia   Social:  Non-Smoker    Hematology/Oncology:     Oncology Normal    -- Anemia:   EENT/Dental:EENT/Dental Normal   Cardiovascular:   Pacemaker (placed 3/18) Hypertension Valvular problems/Murmurs, AS CAD (cabg in past with later stents)  Dysrhythmias  Angina Last angiogram 3/17 showed patent lima and saphenous graft..lad and lf main ok..rca occluded   Pulmonary:   Pneumonia COPD (home oxygen) Asthma Shortness of breath    Renal/:  Renal/ Normal     Hepatic/GI:   GERD    Musculoskeletal:  Musculoskeletal Normal    Neurological:  Neurology Normal    Endocrine:  Endocrine Normal    Dermatological:  Skin Normal    Psych:  Psychiatric Normal           Physical Exam  General:  Well nourished    Airway/Jaw/Neck:  Airway Findings: Mallampati: II TM Distance: 4 - 6 cm      Dental:  Dental Findings:   Chest/Lungs:  Chest/Lungs Clear    Heart/Vascular:  Heart Findings: Normal       Mental Status:  Mental Status Findings:  Cooperative, Alert and Oriented         Anesthesia Plan  Type of Anesthesia, risks & benefits discussed:  Anesthesia Type:  general  Patient's Preference:   Intra-op Monitoring Plan: standard ASA monitors  Intra-op Monitoring Plan Comments:   Post Op Pain Control Plan: multimodal analgesia, IV/PO Opioids PRN and per primary service following discharge from PACU  Post Op Pain Control Plan Comments:   Induction:    Beta Blocker:  Patient is not currently on a Beta-Blocker (No further documentation required).       Informed Consent: Patient understands risks and agrees with Anesthesia plan.  Questions answered. Anesthesia consent signed with patient.  ASA Score: 4     Day of Surgery Review of History & Physical:    H&P update referred to the provider.  H&P  completed by Anesthesiologist.   Anesthesia Plan Notes: Npo  Spoke with dr. Granados...patient has significant cad ..however since the troponins did not rise during acute severe anemia and the hct has been corrected to 8/26 will proceed with procedure        Ready For Surgery From Anesthesia Perspective.

## 2019-10-17 NOTE — HPI
Kemar Perez is a 85 y.o. male that (in part)  has a past medical history of Adenomatous polyps, Anemia, Aortic stenosis, Arthritis, AS (aortic stenosis), Asthma, BBB (bundle branch block), Cataract, COPD (chronic obstructive pulmonary disease), Coronary artery disease, Encounter for blood transfusion, GERD (gastroesophageal reflux disease), GI bleed, H/O food poisoning, H/O: GI bleed, Heart block AV second degree, Hemorrhoids, History of shingles, History of stomach ulcers, HLD (hyperlipidemia), migraines, migraines, Hypertension, Macular degeneration of right eye, PAF (paroxysmal atrial fibrillation), and Pneumonia.  has a past surgical history that includes Coronary artery bypass graft; Joint replacement; Total knee arthroplasty (Right); CATARACT SX (Bilateral); CARDIAC ANGIOGRAM WITH STENTS; CTR (Right); WRIST JOINT REMOVAL (Right); Cardiac pacemaker placement; Cataract extraction; Esophagogastroduodenoscopy (N/A, 4/8/2019); and Colonoscopy (N/A, 4/9/2019). Presents to [facility] as a transfer patient from the [facility] where he presented with generalized fatigue and malaise associated with shortness of breath and dark stool.  Positive epigastric pain, weakness, fatigue, and lightheadedness.  This is a recurrent issue for him.  He had endoscopy in April of this year due to similar symptoms.  Is found to be anemic from an upper GI bleeding from angiodysplasia of the duodenum and ulcer disease. Denies fever chills.  Denies syncope, chest pain, unilateral weakness.  Denies hematemesis.    In the emergency department routine laboratory studies revealed evidence of significant anemia with hemoglobin of 5.5 patient at time screen and was transfused 1 unit of PRBCs prior to transfer.  Multiple attempts were made to transfer the patient to nearby facility is, however GI services are not available.   Hospital medicine has been asked to admit for further evaluation and  treatment, including evaluation by Gastroenterology.

## 2019-10-17 NOTE — [employer] PATIENT INSTRUCTIONS
Discharge Summary/Instructions after an Endoscopic Procedure  Patient Name: Kemar Perez  Patient MRN: [id number]  Patient YOB: 1934 Thursday, October 17, 2019  Fabrice Helm MD  RESTRICTIONS:  During your procedure today, you received medications for sedation.  These   medications may affect your judgment, balance and coordination.  Therefore,   for 24 hours, you have the following restrictions:   - DO NOT drive a car, operate machinery, make legal/financial decisions,   sign important papers or drink alcohol.    ACTIVITY:  Today: no heavy lifting, straining or running due to procedural   sedation/anesthesia.  The following day: return to full activity including work.  DIET:  Eat and drink normally unless instructed otherwise.     TREATMENT FOR COMMON SIDE EFFECTS:  - Mild abdominal pain, nausea, belching, bloating or excessive gas:  rest,   eat lightly and use a heating pad.  - Sore Throat: treat with throat lozenges and/or gargle with warm salt   water.  - Because air was used during the procedure, expelling large amounts of air   from your rectum or belching is normal.  - If a bowel prep was taken, you may not have a bowel movement for 1-3 days.    This is normal.  SYMPTOMS TO WATCH FOR AND REPORT TO YOUR PHYSICIAN:  1. Abdominal pain or bloating, other than gas cramps.  2. Chest pain.  3. Back pain.  4. Signs of infection such as: chills or fever occurring within 24 hours   after the procedure.  5. Rectal bleeding, which would show as bright red, maroon, or black stools.   (A tablespoon of blood from the rectum is not serious, especially if   hemorrhoids are present.)  6. Vomiting.  7. Weakness or dizziness.  GO DIRECTLY TO THE NEAREST EMERGENCY ROOM IF YOU HAVE ANY OF THE FOLLOWING:      Difficulty breathing              Chills and/or fever over 101 F   Persistent vomiting and/or vomiting blood   Severe abdominal pain   Severe chest pain   Black, tarry stools   Bleeding- more than one  tablespoon   Any other symptom or condition that you feel may need urgent attention  Your doctor recommends these additional instructions:  If any biopsies were taken, your doctors clinic will contact you in 1 to 2   weeks with any results.  - Return patient to hospital [facility] for ongoing care.   - Full liquid diet.   - Observe patient's clinical course.   - Perform a small bowel enteroscopy (SBE).  For questions, problems or results please call your physician - Fabrice Helm MD at Work:  [phone].  [facility] can be reached at [phone]     IF A COMPLICATION OR EMERGENCY SITUATION ARISES AND YOU ARE UNABLE TO REACH   YOUR PHYSICIAN - GO DIRECTLY TO THE EMERGENCY ROOM.  Fabrice Helm MD  10/17/2019 2:37:49 PM  This report has been verified and signed electronically.  [employer]

## 2019-10-17 NOTE — H&P
[facility] Medicine  History & Physical    Patient Name: Kemar Perez  MRN: [id number]  Admission Date: 10/17/2019  Attending Physician: Bertram Rader MD, MPH      PCP:     César Priest MD    CC:     Chief Complaint   Patient presents with    GI Bleeding     Transfer for evaluation of acute GI bleeding and symptomatic anemia       HISTORY OF PRESENT ILLNESS:     Kemar Perez is a 85 y.o. male that (in part)  has a past medical history of Adenomatous polyps, Anemia, Aortic stenosis, Arthritis, AS (aortic stenosis), Asthma, BBB (bundle branch block), Cataract, COPD (chronic obstructive pulmonary disease), Coronary artery disease, Encounter for blood transfusion, GERD (gastroesophageal reflux disease), GI bleed, H/O food poisoning, H/O: GI bleed, Heart block AV second degree, Hemorrhoids, History of shingles, History of stomach ulcers, HLD (hyperlipidemia), migraines, migraines, Hypertension, Macular degeneration of right eye, PAF (paroxysmal atrial fibrillation), and Pneumonia.  has a past surgical history that includes Coronary artery bypass graft; Joint replacement; Total knee arthroplasty (Right); CATARACT SX (Bilateral); CARDIAC ANGIOGRAM WITH STENTS; CTR (Right); WRIST JOINT REMOVAL (Right); Cardiac pacemaker placement; Cataract extraction; Esophagogastroduodenoscopy (N/A, 4/8/2019); and Colonoscopy (N/A, 4/9/2019). Presents to [facility] as a transfer patient from the [facility] where he presented with generalized fatigue and malaise associated with shortness of breath and dark stool.  Positive epigastric pain, weakness, fatigue, and lightheadedness.  This is a recurrent issue for him.  He had endoscopy in April of this year due to similar symptoms.  Is found to be anemic from an upper GI bleeding from angiodysplasia of the duodenum and ulcer disease. Denies fever chills.  Denies syncope, chest pain, unilateral weakness.   Denies hematemesis.    In the emergency department routine laboratory studies revealed evidence of significant anemia with hemoglobin of 5.5 patient at time screen and was transfused 1 unit of PRBCs prior to transfer.  Multiple attempts were made to transfer the patient to nearby facility is, however GI services are not available.   Hospital medicine has been asked to admit for further evaluation and treatment, including evaluation by Gastroenterology.       REVIEW OF SYSTEMS:     Constitutional: Positive for fatigue. Negative for fever.   HENT: Negative for sore throat and trouble swallowing.    Eyes: Negative for visual disturbance.    Respiratory: Positive for shortness of breath. Negative for apnea, cough, choking, chest tightness, wheezing and stridor.    Cardiovascular: Negative for chest pain, palpitations and leg swelling.   Gastrointestinal: Positive for abdominal pain (epigastric), blood in stool (melena ) and constipation. Negative for abdominal distention, anal bleeding, diarrhea, nausea, rectal pain and vomiting.   Genitourinary: Negative for difficulty urinating.   Skin: Negative for rash.   Neurological: Positive for weakness and light-headedness. Negative for dizziness, syncope, facial asymmetry, numbness and headaches.   Hematological: Does not bruise/bleed easily.     PAST MEDICAL / SURGICAL HISTORY:     Past Medical History:   Diagnosis Date    Adenomatous polyps     Anemia     Aortic stenosis     Arthritis     AS (aortic stenosis)     Asthma     BBB (bundle branch block)     Cataract     COPD (chronic obstructive pulmonary disease)     Coronary artery disease     Encounter for blood transfusion     GERD (gastroesophageal reflux disease)     GI bleed     H/O food poisoning     H/O: GI bleed     Heart block AV second degree     Hemorrhoids     History of shingles     History of stomach ulcers     HLD (hyperlipidemia)     Hx of migraines     Hx of migraines     Hypertension      Macular degeneration of right eye     PAF (paroxysmal atrial fibrillation)     Pneumonia      Past Surgical History:   Procedure Laterality Date    CARDIAC ANGIOGRAM WITH STENTS      CARDIAC PACEMAKER PLACEMENT      CATARACT EXTRACTION      CATARACT SX Bilateral     COLONOSCOPY N/A 2019    Procedure: COLONOSCOPY;  Surgeon: Sebastien Worthy MD;  Location: [facility];  Service: Endoscopy;  Laterality: N/A;    CORONARY ARTERY BYPASS GRAFT      CTR Right     ESOPHAGOGASTRODUODENOSCOPY N/A 2019    Procedure: EGD (ESOPHAGOGASTRODUODENOSCOPY);  Surgeon: Sebastien Worthy MD;  Location: [facility];  Service: Endoscopy;  Laterality: N/A;    JOINT REPLACEMENT      TOTAL KNEE ARTHROPLASTY Right     WRIST JOINT REMOVAL Right          FAMILY HISTORY:     Family History   Problem Relation Age of Onset    Heart disease Paternal Grandmother     Hypertension Paternal Grandmother     Heart disease Paternal Grandfather     Hypertension Paternal Grandfather     Heart disease Father     Hypertension Father          SOCIAL HISTORY:     Social History     Socioeconomic History    Marital status: Single     Spouse name: Not on file    Number of children: Not on file    Years of education: Not on file    Highest education level: Not on file   Occupational History    Not on file   Social Needs    Financial resource strain: Not on file    Food insecurity:     Worry: Not on file     Inability: Not on file    Transportation needs:     Medical: Not on file     Non-medical: Not on file   Tobacco Use    Smoking status: Former Smoker     Packs/day: 1.00     Types: Cigarettes     Start date:      Last attempt to quit:      Years since quittin.8    Smokeless tobacco: Never Used   Substance and Sexual Activity    Alcohol use: No    Drug use: No    Sexual activity: Not Currently   Lifestyle    Physical activity:     Days per week: Not on file     Minutes per session: Not on file     Stress: Not on file   Relationships    Social connections:     Talks on phone: Not on file     Gets together: Not on file     Attends [employer] service: Not on file     Active member of club or organization: Not on file     Attends meetings of clubs or organizations: Not on file     Relationship status: Not on file   Other Topics Concern    Not on file   Social History Narrative    Not on file         ALLERGIES:       Review of patient's allergies indicates:   Allergen Reactions    Dexamethasone Other (See Comments)     High blood pressure    Mobic [meloxicam] Other (See Comments)     Bleeds easily    Nsaids (non-steroidal anti-inflammatory drug) Other (See Comments)     Bleeding     Spiriva respimat [tiotropium bromide] Other (See Comments)     Dry mouth               HOME MEDICATIONS:     Prior to Admission medications    Medication Sig Start Date End Date Taking? Authorizing Provider   albuterol (ACCUNEB) 0.63 mg/3 mL Nebu Take 0.63 mg by nebulization 3 (three) times daily as needed (sob). Rescue    Historical Provider, MD   albuterol 90 mcg/actuation inhaler Inhale 2 puffs into the lungs every 8 (eight) hours as needed. Rescue    Historical Provider, MD   ALPRAZolam (XANAX) 0.5 MG tablet Take 0.5 mg by mouth 2 (two) times daily. 2/20/19   Historical Provider, MD   amLODIPine (NORVASC) 5 MG tablet Take 1 tablet (5 mg total) by mouth once daily. 4/10/19 4/9/20  Bertram Parish NP   budesonide-formoterol 160-4.5 mcg (SYMBICORT) 160-4.5 mcg/actuation HFAA Inhale 1 puff into the lungs every 12 (twelve) hours. Controller    Historical Provider, MD   cetirizine (ZYRTEC) 10 MG tablet Take 10 mg by mouth once daily.    Historical Provider, MD   cetirizine 10 mg Cap cetirizine 10 mg capsule   Take by oral route.    Historical Provider, MD   clindamycin phosphate (CLINDAGEL) 1 % glqd Clindagel 1 % topical gel, once daily   APPLY A THIN LAYER TO THE AFFECTED AREA(S) BY TOPICAL ROUTE ONCE DAILY    Historical  Provider, MD   cyanocobalamin, vitamin B-12, 1,000 mcg/mL Kit cyanocobalamin (vit B-12) 1,000 mcg/mL injection solution   INJECT ONE ML INTO THE MUSCLES EVERY MONTH    Historical Provider, MD   dicyclomine (BENTYL) 20 mg tablet Take 1 tablet (20 mg total) by mouth every 6 (six) hours as needed. 9/3/19   Keith Paul MD   diltiaZEM (CARDIZEM) 60 MG tablet diltiazem 60 mg tablet   Take 1 tablet 3 times a day by oral route.    Historical Provider, MD   finasteride (PROSCAR) 5 mg tablet Take 5 mg by mouth every evening.     Historical Provider, MD   fluticasone propionate (FLONASE) 50 mcg/actuation nasal spray SPRAY TWICE IN EACH NOSTRIL ONCE A DAY AS DIRECTED 9/14/19   Historical Provider, MD   furosemide (LASIX) 20 MG tablet Take 1 tablet (20 mg total) by mouth once daily. for 15 doses 6/20/19 7/5/19  Jair Adam Jr., MD   hydrochlorothiazide (HYDRODIURIL) 25 MG tablet Take 12.5 mg by mouth once daily.    Historical Provider, MD   hydrocodone-acetaminophen 5-325mg (NORCO) 5-325 mg per tablet Take 1 tablet by mouth every 4 (four) hours as needed (pain 5-10/10 pain scale). 3/15/18   Сергей Arcos MD   ipratropium (ATROVENT) 0.02 % nebulizer solution Take 500 mcg by nebulization 3 (three) times daily as needed for Wheezing. Rescue    Historical Provider, MD   isosorbide mononitrate (IMDUR) 30 MG 24 hr tablet Take 1 tablet by mouth once daily. 2/14/18   Historical Provider, MD   ketoconazole (NIZORAL) 2 % shampoo Apply topically daily as needed.     Historical Provider, MD   lisinopril (PRINIVIL,ZESTRIL) 40 MG tablet Take 40 mg by mouth once daily.    Historical Provider, MD   meclizine (ANTIVERT) 12.5 mg tablet Take 1 tablet (12.5 mg total) by mouth 3 (three) times daily as needed for Dizziness. 4/11/18   Kenny Rojas MD   metoprolol succinate (TOPROL-XL) 25 MG 24 hr tablet Take 25 mg by mouth once daily. 9/16/19   Historical Provider, MD   mirtazapine (REMERON) 15 MG tablet Take 15 mg by mouth every  evening.    Historical Provider, MD   ondansetron (ZOFRAN) 4 MG tablet Take 1 tablet (4 mg total) by mouth every 6 (six) hours as needed for Nausea. 9/3/19   Keith Paul MD   pantoprazole (PROTONIX) 40 MG tablet Take 1 tablet (40 mg total) by mouth once daily. 10/9/19 10/8/20  Patti Cohen MD   polyethylene glycol (GLYCOLAX) 17 gram/dose powder Take 17 g by mouth once daily. 10/9/19   Patti Cohen MD   potassium chloride SA (K-DUR,KLOR-CON) 20 MEQ tablet Take 20 mEq by mouth 2 (two) times daily. 9/13/19   Historical Provider, MD   ranitidine (ZANTAC) 150 MG tablet Take 150 mg by mouth 2 (two) times daily. 9/13/19   Historical Provider, MD   rOPINIRole (REQUIP) 1 MG tablet ropinirole 1 mg tablet    Historical Provider, MD   rosuvastatin (CRESTOR) 10 MG tablet TAKE ONE TABLET BY MOUTH ONCE A DAY IN THE EVENING 9/16/19   Historical Provider, MD   sildenafil (VIAGRA) 100 MG tablet Take 100 mg by mouth daily as needed for Erectile Dysfunction.    Historical Provider, MD   simvastatin (ZOCOR) 20 MG tablet Take 10 mg by mouth every evening.    Historical Provider, MD   tiotropium bromide 2.5 mcg/actuation Mist Inhale into the lungs 2 (two) times daily.    Historical Provider, MD   traZODone (DESYREL) 100 MG tablet TAKE ONE TABLET BY MOUTH ONCE A DAY AT BEDTIME 2/5/19   Historical Provider, MD          HOSPITAL MEDICATIONS:     Scheduled Meds:    finasteride  5 mg Oral QHS    rOPINIRole  1 mg Oral QHS    simvastatin  10 mg Oral QHS     Continuous Infusions:    dextrose 5 % and 0.9 % NaCl      pantoprazole 40 mg in dextrose 5 % 100 mL infusion (ready to mix system)       PRN Meds: sodium chloride, acetaminophen, ALPRAZolam, morphine, ondansetron, prochlorperazine, sodium chloride 0.9%      PHYSICAL EXAM:     Wt Readings from Last 1 Encounters:   10/17/19 0201 80.2 kg (176 lb 12.9 oz)     Body mass index is 24.66 kg/m².  Vitals:    10/17/19 0158 10/17/19 0201   BP: (!) 169/69    Pulse: 65    Resp: 16   "  Temp: 97.1 °F (36.2 °C)    SpO2: 99%    Weight:  80.2 kg (176 lb 12.9 oz)   Height:  5' 11" (1.803 m)          -- General appearance: well developed. appears stated age   -- Head: normocephalic, atraumatic   -- Eyes:  Pale conjunctiva. Extraocular muscles intact  -- Nose: Nares normal. Septum midline.   -- Mouth/Throat: lips, mucosa, and tongue normal. no throat erythema.   -- Neck: supple, symmetrical, trachea midline, no JVD and thyroid not grossly enlarged, appears symmetric  -- Lungs: clear to auscultation bilaterally. normal respiratory effort. No use of accessory muscles.   -- Chest wall:  Well-healed sternotomy scar.  no tenderness. equal bilateral chest rise   -- Heart: regular rate and regular rhythm. S1, S2 normal.  no click, rub or gallop   -- Abdomen: soft, non-tender, non-distended, non-tympanic; bowel sounds normal; no masses  -- Extremities: no cyanosis, clubbing or edema.   -- Pulses: 2+ and symmetric   -- Skin:  Skin pallor.  Turgor normal. Texture normal. No rashes or lesions.   -- Neurologic:  Globally decreased muscle of strength and tone. No focal numbness or weakness. CNII-XII intact. [place] coma scale: eyes open spontaneously-4, oriented & converses-5, obeys commands-6.      LABORATORY STUDIES:     Recent Results (from the past 36 hour(s))   CBC auto differential    Collection Time: 10/16/19  5:47 PM   Result Value Ref Range    WBC 9.49 3.90 - 12.70 K/uL    RBC 2.35 (L) 4.60 - 6.20 M/uL    Hemoglobin 5.5 (LL) 14.0 - 18.0 g/dL    Hematocrit 19.3 (LL) 40.0 - 54.0 %    Mean Corpuscular Volume 82 82 - 98 fL    Mean Corpuscular Hemoglobin 23.4 (L) 27.0 - 31.0 pg    Mean Corpuscular Hemoglobin Conc 28.5 (L) 32.0 - 36.0 g/dL    RDW 13.6 11.5 - 14.5 %    Platelets 301 150 - 350 K/uL    MPV 12.1 9.2 - 12.9 fL    Gran # (ANC) 7.3 1.8 - 7.7 K/uL    Lymph # 1.4 1.0 - 4.8 K/uL    Mono # 0.8 0.3 - 1.0 K/uL    Eos # 0.0 0.0 - 0.5 K/uL    Baso # 0.01 0.00 - 0.20 K/uL    Gran% 77.1 (H) 38.0 - 73.0 %    " Lymph% 14.2 (L) 18.0 - 48.0 %    Mono% 8.4 4.0 - 15.0 %    Eosinophil% 0.4 0.0 - 8.0 %    Basophil% 0.1 0.0 - 1.9 %    Poik Moderate     Hypo Moderate     Ovalocytes Occasional     Schistocytes Present     Differential Method Automated    Comprehensive metabolic panel    Collection Time: 10/16/19  5:47 PM   Result Value Ref Range    Sodium 136 136 - 145 mmol/L    Potassium 3.5 3.5 - 5.1 mmol/L    Chloride 102 95 - 110 mmol/L    CO2 24 23 - 29 mmol/L    Glucose 117 (H) 70 - 110 mg/dL    BUN, Bld 26 (H) 2 - 20 mg/dL    Creatinine 1.07 0.50 - 1.40 mg/dL    Calcium 8.6 (L) 8.7 - 10.5 mg/dL    Total Protein 6.6 6.0 - 8.4 g/dL    Albumin 3.8 3.5 - 5.2 g/dL    Total Bilirubin 0.2 0.1 - 1.0 mg/dL    Alkaline Phosphatase 78 38 - 126 U/L    AST 20 15 - 46 U/L    ALT 14 10 - 44 U/L    Anion Gap 10 8 - 16 mmol/L    eGFR if African American >60.0 >60 mL/min/1.73 m^2    eGFR if non African American >60.0 >60 mL/min/1.73 m^2   Lipase    Collection Time: 10/16/19  5:47 PM   Result Value Ref Range    Lipase Result 2290 (H) 23 - 300 U/L   Troponin I    Collection Time: 10/16/19  5:47 PM   Result Value Ref Range    Troponin I 0.028 0.012 - 0.034 ng/mL   Type & Screen    Collection Time: 10/16/19  6:48 PM   Result Value Ref Range    ABO A     Rh Type POS     Antibody Screen NEG    Prepare RBC 1 Unit    Collection Time: 10/16/19  6:48 PM   Result Value Ref Range    UNIT NUMBER [id number]     Product Code D2690C95     DISPENSE STATUS TRANSFUSED     CODING SYSTEM NBYL419     Unit Blood Type Code 6200     Unit Blood Type A POS     Unit Expiration [id number]    Occult blood x 1, stool    Collection Time: 10/16/19  7:52 PM   Result Value Ref Range    Occult Blood Positive (A) Negative   Urinalysis    Collection Time: 10/16/19  8:41 PM   Result Value Ref Range    Specimen UA Urine, Clean Catch     Color, UA Yellow Yellow, Straw, Marni    Appearance, UA Clear Clear    pH, UA 6.0 5.0 - 8.0    Specific Gravity, UA 1.020 1.005 - 1.030     Protein, UA Negative Negative    Glucose, UA Negative Negative    Ketones, UA Negative Negative    Bilirubin (UA) Negative Negative    Occult Blood UA Negative Negative    Nitrite, UA Negative Negative    Urobilinogen, UA Negative <2.0 EU/dL    Leukocytes, UA Negative Negative       Lab Results   Component Value Date    INR 1.1 09/03/2019     Lab Results   Component Value Date    HGBA1C 5.6 04/06/2019     No results for input(s): POCTGLUCOSE in the last 72 hours.            IMAGING:       CT abdomen pelvis with contrast  Pending results    CONSULTS:     IP CONSULT TO GI       ASSESSMENT & PLAN:     Primary Diagnosis:  Acute upper GI bleed    Active Hospital Problems    Diagnosis  POA    *Acute upper GI bleed [K92.2]  Yes     Priority: 1 - High    Melena [K92.1]  Yes     Priority: 2     Symptomatic anemia [D64.9]  Yes     Priority: 2     COPD (chronic obstructive pulmonary disease) [J44.9]  Yes     Priority: 3      Usual meds      Essential hypertension [I10]  Yes    Hyperlipidemia [E78.5]  Yes    Duodenal hemorrhage due to angiodysplasia of duodenum [K31.811]  Yes     Stable post-EGD.      Angina, class II [I20.9]  Yes      Resolved Hospital Problems   No resolved problems to display.       Symptomatic anemia secondary to Melena secondary to Acute Upper GI bleeding  History of gastric ulcers and duodenal hemorrhage due to angiodysplasia of the duodenum on previous EGD earlier this year.  Concern for recurrent bleed.  · As evidence by physical exam and positive occult blood testing  · Possible etiology includes: hemorrhagic gastritis, ulcer (duodenal / gastric), gastric varices, malignancy, Vale Richards tear  · Monitor with serial H&H  · Give Protonix 80 mg IV bolus followed by 8 mg per hour IV drip  · Hold all anticoagulation medications  · N.p.o. except  Medications  · Provide IV fluids  · Obtain blood type and screen  · Maintain two large-bore IVs at all times  · Transfusing 2 units PRBCs  · Consult  gastroenterology    COPD  · No acute issues  · Continue with home medication regimen  · Nebulizer treatments (albuterol/atrovent)  · Titrate O2 sats between 88 to 93%.  No supplemental 02 for 02 saturation greater than 93% due to V/Q mismatch  · Consider initiating regimen of Breo, Advair 500/50mg, Spiriva 18mcg, and/or Daliresp 500mcg at or before discharge.  May also defer to outpatient pulmonology after formal pulmonary function testing.  · Mucolytics as needed  · Outpatient referral to pulmonology for further optimization  · Has not used tobacco since 1983    Hyperlipidemia  · Lipid panel - as an outpatient  · Cardiac diet  · Continue statin    Essential Hypertension  · Goal while inpatient is a systolic blood pressure less than 160mmHg  · BP in acceptable range at this time  · Holding current home regimen due to active GI bleeding as noted above  · PRN antihypertensives available              VTE Risk Mitigation (From admission, onward)         Ordered     IP VTE HIGH RISK PATIENT  Once      10/17/19 0351     Place HUBERT hose  Until discontinued      10/17/19 0351     Reason for No Pharmacological VTE Prophylaxis  Once      10/17/19 0351     Place sequential compression device  Until discontinued      10/17/19 0351                  Adult PRN medications available   DVT prophylaxis given       DISPOSITION:     Will admit to the Hospital Medicine service for further evaluation and treatment.    Chart reviewed and updated where applicable.    High Risk Conditions:  Patient has a condition that poses threat to life and bodily function:  Acute GI bleeding      ===============================================================    Bertram Rader MD, MPH  Department of Hospital Medicine   [facility]  [phone] pg  (7pm - 6am)          This note is dictated using [employer] voice recognition software.  There are word recognition mistakes that are occasionally missed on review.

## 2019-10-17 NOTE — NURSING
Pt received as transfer from outside facility with dx GI bleed; AAOx4; NAD noted; admits to feelings of weakness and dizziness, same as before per pt's statement; VSS; afebrile; Protonix gtt continues on transfer; oriented to room; safety precautions maintained; bed alarm engaged for fall precautions; call bell within reach; assessment per flowsheet; meds administered per MAR; WCTM

## 2019-10-17 NOTE — PLAN OF CARE
New admit received with dx GI bleed; 0 active bleeding noted; VS remained stable; does c/o dizziness/lightheadedness; in process of receiving 1U PRBC for low H/H GI consult pending; held NPO for possible studies; assessment per flowsheet; meds per MAR; safety precautions maintained; 0 fall, injury or acute events this shift; WCTM

## 2019-10-17 NOTE — NURSING
Order noted to transfuse 1 unit PRBC; called to Blood bank; since pt is a transfer from outside facility will need to redraw Type and screen before preparing blood; order placed; pending release of blood; pt made aware

## 2019-10-17 NOTE — PROGRESS NOTES
Patient seen and examined. Currently receiving blood transfusion. No complaints. Stable on home 2L NC. GI consult pending.    Need to do med rec-- meds are from [facility].     Idalia Seth MD  10/17/2019 9:43 AM

## 2019-10-17 NOTE — NURSING
Note that patient awake, alert and fully oriented, on continuous  O2@3L NC;   NPO maintained; Denies pain at this time; RBC's transfusing at 125 mL/hr; 370.3 ml remaining to transfuse; No reaction from transfusion;  Also, continuous pantoprazole 40 mg infusing at 20 mL/hr;     Will facilitate GI consult this am and continue to monitor;

## 2019-10-17 NOTE — CONSULTS
.[facility]  Gastroenterology  Consult Note    Patient Name: Kemar Perez  MRN: [id number]  Admission Date: 10/17/2019  Hospital Length of Stay: 0 days  Code Status: Full Code   Primary Care Physician: César Priest MD  Principal Problem:Acute upper GI bleed    Consults  Subjective:     Chief complaint: Symptomatic anemia, abdominal pain     HPI: The patient is an 85 year old male with a history of HTN, CAD s/p CABG, PCI, PAF, COPD and HLD transferred from outside facility for symptomatic anemia.  He reports acute onset, persistent, weakness with associated fatigue and dyspnea on exertion over the past few days.  He also reports moderate epigastric burning pain beginning yesterday.  No nausea or vomiting.  He reports one formed bowel movement daily.  He is unable to see the color but denies change in stool frequency/consistency.  No NSAID or anticoagulant use.  He takes iron supplement and PPI daily at home.  He has a history of GI bleeding in the past, attributed to AVMs.  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  His most recent EGD was in 3/2019 which showed a non-bleeding gastric AVM.  He also underwent colonoscopy at that time which was remarkable for only diverticulosis.  He then underwent video capsule endoscopy at Dr. Worthy's office but the results are not known.     Past medical history:  Hypertension.  Coronary artery disease.  Paroxysmal atrial fibrillation.  Aortic stenosis.   Chronic obstructive pulmonary disease.  Hyperlipidemia.  Macular degeneration.    Past surgical history:  Pacemaker insertion.  Coronary artery bypass graft.  Total knee arthroplasty.  Wrist surgery.     Social history:  Tobacco use: denies.  Alcohol use: seldom use.  Illicit drug use: denies.     Family history:  No family history of colon cancer.     Medications:  Medications Prior to Admission   Medication Sig Dispense Refill Last Dose    albuterol (ACCUNEB) 0.63 mg/3 mL Nebu Take 0.63 mg by  nebulization 3 (three) times daily as needed (sob). Rescue   Taking    albuterol 90 mcg/actuation inhaler Inhale 2 puffs into the lungs every 8 (eight) hours as needed. Rescue   Taking    ALPRAZolam (XANAX) 0.5 MG tablet Take 0.5 mg by mouth 2 (two) times daily.  0 Taking    amLODIPine (NORVASC) 5 MG tablet Take 1 tablet (5 mg total) by mouth once daily. 30 tablet 11 Taking    budesonide-formoterol 160-4.5 mcg (SYMBICORT) 160-4.5 mcg/actuation HFAA Inhale 1 puff into the lungs every 12 (twelve) hours. Controller   Taking    cetirizine (ZYRTEC) 10 MG tablet Take 10 mg by mouth once daily.   Taking    cetirizine 10 mg Cap cetirizine 10 mg capsule   Take by oral route.       clindamycin phosphate (CLINDAGEL) 1 % glqd Clindagel 1 % topical gel, once daily   APPLY A THIN LAYER TO THE AFFECTED AREA(S) BY TOPICAL ROUTE ONCE DAILY   Taking    cyanocobalamin, vitamin B-12, 1,000 mcg/mL Kit cyanocobalamin (vit B-12) 1,000 mcg/mL injection solution   INJECT ONE ML INTO THE MUSCLES EVERY MONTH       dicyclomine (BENTYL) 20 mg tablet Take 1 tablet (20 mg total) by mouth every 6 (six) hours as needed. 20 tablet 0 Taking    diltiaZEM (CARDIZEM) 60 MG tablet diltiazem 60 mg tablet   Take 1 tablet 3 times a day by oral route.   Taking    finasteride (PROSCAR) 5 mg tablet Take 5 mg by mouth every evening.    Taking    fluticasone propionate (FLONASE) 50 mcg/actuation nasal spray SPRAY TWICE IN EACH NOSTRIL ONCE A DAY AS DIRECTED  0     furosemide (LASIX) 20 MG tablet Take 1 tablet (20 mg total) by mouth once daily. for 15 doses 15 tablet 0     hydrochlorothiazide (HYDRODIURIL) 25 MG tablet Take 12.5 mg by mouth once daily.   Taking    hydrocodone-acetaminophen 5-325mg (NORCO) 5-325 mg per tablet Take 1 tablet by mouth every 4 (four) hours as needed (pain 5-10/10 pain scale).  0 Taking    ipratropium (ATROVENT) 0.02 % nebulizer solution Take 500 mcg by nebulization 3 (three) times daily as needed for Wheezing. Rescue    Taking    isosorbide mononitrate (IMDUR) 30 MG 24 hr tablet Take 1 tablet by mouth once daily.   Taking    ketoconazole (NIZORAL) 2 % shampoo Apply topically daily as needed.    Taking    lisinopril (PRINIVIL,ZESTRIL) 40 MG tablet Take 40 mg by mouth once daily.   Taking    meclizine (ANTIVERT) 12.5 mg tablet Take 1 tablet (12.5 mg total) by mouth 3 (three) times daily as needed for Dizziness. 20 tablet 0 Taking    metoprolol succinate (TOPROL-XL) 25 MG 24 hr tablet Take 25 mg by mouth once daily.  11     mirtazapine (REMERON) 15 MG tablet Take 15 mg by mouth every evening.   Taking    ondansetron (ZOFRAN) 4 MG tablet Take 1 tablet (4 mg total) by mouth every 6 (six) hours as needed for Nausea. 20 tablet 0 Taking    pantoprazole (PROTONIX) 40 MG tablet Take 1 tablet (40 mg total) by mouth once daily. 30 tablet 11     polyethylene glycol (GLYCOLAX) 17 gram/dose powder Take 17 g by mouth once daily. 1 Bottle 11     potassium chloride SA (K-DUR,KLOR-CON) 20 MEQ tablet Take 20 mEq by mouth 2 (two) times daily.  11     ranitidine (ZANTAC) 150 MG tablet Take 150 mg by mouth 2 (two) times daily.  11     rOPINIRole (REQUIP) 1 MG tablet ropinirole 1 mg tablet       rosuvastatin (CRESTOR) 10 MG tablet TAKE ONE TABLET BY MOUTH ONCE A DAY IN THE EVENING  11     sildenafil (VIAGRA) 100 MG tablet Take 100 mg by mouth daily as needed for Erectile Dysfunction.   Taking    simvastatin (ZOCOR) 20 MG tablet Take 10 mg by mouth every evening.   Taking    tiotropium bromide 2.5 mcg/actuation Mist Inhale into the lungs 2 (two) times daily.   Taking    traZODone (DESYREL) 100 MG tablet TAKE ONE TABLET BY MOUTH ONCE A DAY AT BEDTIME  3 Taking     Allergies:  Dexamethasone, mobic.     Review of systems:  CONSTITUTIONAL: Positive for weakness. Negative for fever, chills, weight loss, weight gain.  HEENT: Negative for blurred vision, hearing loss, nasal congestion, dry mouth, sore throat.  CARDIOVASCULAR: Negative for chest  pain or palpitations.  RESPIRATORY: Positive for DHALIWAL. No cough.  GASTROINTESTINAL: See HPI  GENITOURINARY: Negative for dysuria or hematuria.  MUSCULOSKELETAL: Negative for osteoarthritis or muscle pain.  SKIN: Negative for rashes/lesions.  NEUROLOGIC: Negative for headaches, numbness/tingling.  ENDOCRINE: Negative for diabetes or thyroid abnormalities.  HEMATOLOGIC: Positive for anemia.  Negative for blood dyscrasias.    Objective:     Vital Signs (Most Recent):  Temp: 97.9 °F (36.6 °C) (10/17/19 1128)  Pulse: 60 (10/17/19 1128)  Resp: 16 (10/17/19 1128)  BP: (!) 155/65 (10/17/19 1128)  SpO2: 100 % (10/17/19 1128) Vital Signs (24h Range):  Temp:  [96.8 °F (36 °C)-98.4 °F (36.9 °C)] 97.9 °F (36.6 °C)  Pulse:  [60-80] 60  Resp:  [15-20] 16  SpO2:  [99 %-100 %] 100 %  BP: (128-169)/(58-79) 155/65     Physical examination:  General: well developed, well nourished, no apparent distress  HENT: NCAT, atraumatic, hearing grossly intact, no visible or palpable thyroid mass  Eyes: PERRL, EOMI, anicteric sclera  Cardiovascular: Regular rate and rhythm. No peripheral edema.   Lungs: Non-labored respirations. Breath sounds equal.   Abdomen: soft, NTND, normoactive BS  Extremities: No C/C, 2+ dorsalis pedis pulses bilaterally  Neuro: AA&O x 3, no asterixes or tremors  Psych: Appropriate mood and affect. No SI.  Skin: No jaundice, rashes or lesions  Musculoskeletal: 5/5 strength bilaterally    Recent Labs   Lab 10/16/19  1747 10/17/19  0350   WBC 9.49  --    HGB 5.5* 6.4*   HCT 19.3* 21.7*     --      Recent Labs   Lab 10/16/19  1747 10/17/19  0350   * 113*   CALCIUM 8.6* 8.5*   ALBUMIN 3.8  --    PROT 6.6  --     137   K 3.5 3.7   CO2 24 26    104   BUN 26* 19   CREATININE 1.07 1.0   ALKPHOS 78  --    ALT 14  --    AST 20  --    BILITOT 0.2  --      Recent Labs   Lab 10/17/19  0350   INR 1.0     Imaging:  CT abdomen/pelvis with contrast (10/16/19):  Impression: No acute findings.       Assessment:   85  year old male with a history of HTN, CAD s/p CABG, PCI, PAF, COPD, HLD and GI bleeding attributed to AVM's transferred from outside facility for symptomatic anemia and possible GI bleed.  Transfused 3 units PRBCs (?).     Plan:   1.  EGD/push enteroscopy today.  2.  Anesthesia consultation.  3.  OK for PPI BID.  4.  Post-transfusion CBC.  5.  Will need to follow-up with primary GI physician in near future.      Thank you for your consult.     Christine Lynn PA-C  Gastroenterology  [facility]

## 2019-10-18 PROBLEM — I20.9 ANGINA, CLASS II: Status: RESOLVED | Noted: 2017-05-15 | Resolved: 2019-10-18

## 2019-10-18 PROBLEM — J96.11 CHRONIC RESPIRATORY FAILURE WITH HYPOXIA: Status: ACTIVE | Noted: 2019-10-18

## 2019-10-18 PROBLEM — I25.10 CORONARY ARTERY DISEASE INVOLVING NATIVE CORONARY ARTERY OF NATIVE HEART WITHOUT ANGINA PECTORIS: Status: ACTIVE | Noted: 2019-10-18

## 2019-10-18 PROBLEM — R53.83 FATIGUE: Status: RESOLVED | Noted: 2019-04-24 | Resolved: 2019-10-18

## 2019-10-18 PROBLEM — R06.02 SOB (SHORTNESS OF BREATH): Status: RESOLVED | Noted: 2019-02-21 | Resolved: 2019-10-18

## 2019-10-18 PROBLEM — R10.84 ABDOMINAL PAIN, GENERALIZED: Status: RESOLVED | Noted: 2019-10-09 | Resolved: 2019-10-18

## 2019-10-18 LAB
ANION GAP SERPL CALC-SCNC: 7 MMOL/L (ref 8–16)
BASOPHILS # BLD AUTO: 0.01 K/UL (ref 0–0.2)
BASOPHILS NFR BLD: 0.1 % (ref 0–1.9)
BUN SERPL-MCNC: 15 MG/DL (ref 8–23)
CALCIUM SERPL-MCNC: 8.6 MG/DL (ref 8.7–10.5)
CHLORIDE SERPL-SCNC: 105 MMOL/L (ref 95–110)
CO2 SERPL-SCNC: 27 MMOL/L (ref 23–29)
CREAT SERPL-MCNC: 0.9 MG/DL (ref 0.5–1.4)
DIFFERENTIAL METHOD: ABNORMAL
EOSINOPHIL # BLD AUTO: 0.2 K/UL (ref 0–0.5)
EOSINOPHIL NFR BLD: 3.4 % (ref 0–8)
ERYTHROCYTE [DISTWIDTH] IN BLOOD BY AUTOMATED COUNT: 14.4 % (ref 11.5–14.5)
EST. GFR  (AFRICAN AMERICAN): >60 ML/MIN/1.73 M^2
EST. GFR  (NON AFRICAN AMERICAN): >60 ML/MIN/1.73 M^2
GLUCOSE SERPL-MCNC: 92 MG/DL (ref 70–110)
HCT VFR BLD AUTO: 27.9 % (ref 40–54)
HGB BLD-MCNC: 8.5 G/DL (ref 14–18)
IMM GRANULOCYTES # BLD AUTO: 0.03 K/UL (ref 0–0.04)
IMM GRANULOCYTES NFR BLD AUTO: 0.4 % (ref 0–0.5)
LYMPHOCYTES # BLD AUTO: 1.3 K/UL (ref 1–4.8)
LYMPHOCYTES NFR BLD: 18.2 % (ref 18–48)
MCH RBC QN AUTO: 26.3 PG (ref 27–31)
MCHC RBC AUTO-ENTMCNC: 30.5 G/DL (ref 32–36)
MCV RBC AUTO: 86 FL (ref 82–98)
MONOCYTES # BLD AUTO: 0.6 K/UL (ref 0.3–1)
MONOCYTES NFR BLD: 7.8 % (ref 4–15)
NEUTROPHILS # BLD AUTO: 5 K/UL (ref 1.8–7.7)
NEUTROPHILS NFR BLD: 70.1 % (ref 38–73)
NRBC BLD-RTO: 0 /100 WBC
PLATELET # BLD AUTO: 255 K/UL (ref 150–350)
PMV BLD AUTO: 11.7 FL (ref 9.2–12.9)
POTASSIUM SERPL-SCNC: 4.2 MMOL/L (ref 3.5–5.1)
RBC # BLD AUTO: 3.23 M/UL (ref 4.6–6.2)
SODIUM SERPL-SCNC: 139 MMOL/L (ref 136–145)
WBC # BLD AUTO: 7.14 K/UL (ref 3.9–12.7)

## 2019-10-18 PROCEDURE — 63600175 PHARM REV CODE 636 W HCPCS: Performed by: HOSPITALIST

## 2019-10-18 PROCEDURE — 80048 BASIC METABOLIC PNL TOTAL CA: CPT

## 2019-10-18 PROCEDURE — 25000003 PHARM REV CODE 250: Performed by: HOSPITALIST

## 2019-10-18 PROCEDURE — 21400001 HC TELEMETRY ROOM

## 2019-10-18 PROCEDURE — 94761 N-INVAS EAR/PLS OXIMETRY MLT: CPT

## 2019-10-18 PROCEDURE — 85025 COMPLETE CBC W/AUTO DIFF WBC: CPT

## 2019-10-18 PROCEDURE — 36415 COLL VENOUS BLD VENIPUNCTURE: CPT

## 2019-10-18 RX ORDER — LOPERAMIDE HYDROCHLORIDE 2 MG/1
2 CAPSULE ORAL 4 TIMES DAILY PRN
Status: DISCONTINUED | OUTPATIENT
Start: 2019-10-18 | End: 2019-10-19 | Stop reason: HOSPADM

## 2019-10-18 RX ADMIN — LISINOPRIL 20 MG: 20 TABLET ORAL at 08:10

## 2019-10-18 RX ADMIN — ROSUVASTATIN CALCIUM 20 MG: 10 TABLET, COATED ORAL at 08:10

## 2019-10-18 RX ADMIN — PANTOPRAZOLE SODIUM 40 MG: 40 TABLET, DELAYED RELEASE ORAL at 08:10

## 2019-10-18 RX ADMIN — LOPERAMIDE HYDROCHLORIDE 2 MG: 2 CAPSULE ORAL at 11:10

## 2019-10-18 RX ADMIN — HYDROCHLOROTHIAZIDE 25 MG: 25 TABLET ORAL at 08:10

## 2019-10-18 RX ADMIN — ISOSORBIDE MONONITRATE 60 MG: 30 TABLET, EXTENDED RELEASE ORAL at 08:10

## 2019-10-18 RX ADMIN — ALPRAZOLAM 0.5 MG: 0.5 TABLET ORAL at 10:10

## 2019-10-18 RX ADMIN — ROPINIROLE HYDROCHLORIDE 1 MG: 0.25 TABLET, FILM COATED ORAL at 09:10

## 2019-10-18 RX ADMIN — FUROSEMIDE 40 MG: 40 TABLET ORAL at 08:10

## 2019-10-18 RX ADMIN — SODIUM CHLORIDE 1000 ML: 0.9 INJECTION, SOLUTION INTRAVENOUS at 12:10

## 2019-10-18 RX ADMIN — LOPERAMIDE HYDROCHLORIDE 2 MG: 2 CAPSULE ORAL at 05:10

## 2019-10-18 NOTE — ASSESSMENT & PLAN NOTE
Presented with melena and fatigue  Hgb 5.5 on admit  Received 2U RBCs on 10/17 with appropriate response  Has a history of recurrent upper GI bleeding with angioectasias and iron deficiency anemia from chronic blood loss  EGD 10/18 did not show source of bleeding  PPI transitioned from IV to PO  GI following and planning small bowel enteroscopy  Patient still having melena

## 2019-10-18 NOTE — PLAN OF CARE
"   10/18/19 1149   Discharge Assessment   Assessment Type Discharge Planning Assessment   Confirmed/corrected address and phone number on facesheet? Yes   Assessment information obtained from? Patient   Communicated expected length of stay with patient/caregiver yes   Prior to hospitilization cognitive status: Alert/Oriented   Prior to hospitalization functional status: Independent   Current cognitive status: Alert/Oriented   Current Functional Status: Independent   Lives With friend(s)  (Rodrigo Perez)   Able to Return to Prior Arrangements yes   Is patient able to care for self after discharge? No   Who are your caregiver(s) and their phone number(s)? Ly Perez  (daughter)  [phone]   Patient's perception of discharge disposition admitted as an inpatient   Readmission Within the Last 30 Days no previous admission in last 30 days   Patient currently being followed by outpatient case management? No   Patient currently receives any other outside agency services? No   Equipment Currently Used at Home none   Do you have any problems affording any of your prescribed medications? No   Is the patient taking medications as prescribed? yes   Does the patient have transportation home? Yes   Transportation Anticipated family or friend will provide  (RODRIGO)   Does the patient receive services at the [facility]? No   Discharge Plan A Home Health;Home   Discharge Plan B Home Health;Home   DME Needed Upon Discharge  none   Patient/Family in Agreement with Plan yes     SW met with patient to complete discharge needs assessment. SW reviewed with patient contents of "[facility] Packet" including "help at home", "things patient responsible for to manage his  health at home" and "preferences". Patient was able to verbalize his help at home is Rodrigo. MARIANGEL discussed with patient the things he is  responsible for to manage his health at home would be by going on his doctor appointments, taking medications as prescribed, and getting " prescriptions filled. SW wrote name and phone number on white communication board. Patient prefers evenings appointment.       [facility], [place] - [address]  [address]  [facility] [address]  Phone: [phone] Fax: [phone]

## 2019-10-18 NOTE — ANESTHESIA POSTPROCEDURE EVALUATION
Anesthesia Post Evaluation    Patient: Kemar Perez    Procedure(s) Performed: Procedure(s) (LRB):  EGD (ESOPHAGOGASTRODUODENOSCOPY) (N/A)    Final Anesthesia Type: general  Patient location during evaluation: PACU  Patient participation: Yes- Able to Participate  Level of consciousness: awake and alert, oriented and awake  Post-procedure vital signs: reviewed and stable  Pain management: adequate  Airway patency: patent  PONV status at discharge: No PONV  Anesthetic complications: no      Cardiovascular status: blood pressure returned to baseline, hemodynamically stable and stable  Respiratory status: unassisted and spontaneous ventilation  Hydration status: euvolemic  Follow-up not needed.          Vitals Value Taken Time   BP 73/44 10/18/2019 11:57 AM   Temp 36.4 °C (97.6 °F) 10/18/2019 11:57 AM   Pulse 62 10/18/2019 11:57 AM   Resp 16 10/18/2019 11:57 AM   SpO2 99 % 10/18/2019 11:57 AM         Event Time     Out of Recovery 15:18:48          Pain/Kourtney Score: Kourtney Score: 9 (10/17/2019  3:10 PM)  Modified Kourtney Score: 20 (10/17/2019  3:10 PM)

## 2019-10-18 NOTE — PROGRESS NOTES
[facility] Hospital Medicine  Progress Note    Patient Name: Kemar Perez  MRN: [id number]  Patient Class: IP- Inpatient   Admission Date: 10/17/2019  Length of Stay: 1 days  Attending Physician: Idalia Seth MD  Primary Care Provider: César Priest MD        Subjective:     Principal Problem:Acute upper GI bleed        HPI:  Kemar Perez is a 85 y.o. male that (in part)  has a past medical history of Adenomatous polyps, Anemia, Aortic stenosis, Arthritis, AS (aortic stenosis), Asthma, BBB (bundle branch block), Cataract, COPD (chronic obstructive pulmonary disease), Coronary artery disease, Encounter for blood transfusion, GERD (gastroesophageal reflux disease), GI bleed, H/O food poisoning, H/O: GI bleed, Heart block AV second degree, Hemorrhoids, History of shingles, History of stomach ulcers, HLD (hyperlipidemia), migraines, migraines, Hypertension, Macular degeneration of right eye, PAF (paroxysmal atrial fibrillation), and Pneumonia.  has a past surgical history that includes Coronary artery bypass graft; Joint replacement; Total knee arthroplasty (Right); CATARACT SX (Bilateral); CARDIAC ANGIOGRAM WITH STENTS; CTR (Right); WRIST JOINT REMOVAL (Right); Cardiac pacemaker placement; Cataract extraction; Esophagogastroduodenoscopy (N/A, 4/8/2019); and Colonoscopy (N/A, 4/9/2019). Presents to [facility] as a transfer patient from the [place]  [facility] where he presented with generalized fatigue and malaise associated with shortness of breath and dark stool.  Positive epigastric pain, weakness, fatigue, and lightheadedness.  This is a recurrent issue for him.  He had endoscopy in April of this year due to similar symptoms.  Is found to be anemic from an upper GI bleeding from angiodysplasia of the duodenum and ulcer disease. Denies fever chills.  Denies syncope, chest pain, unilateral weakness.  Denies hematemesis.    In the emergency department  routine laboratory studies revealed evidence of significant anemia with hemoglobin of 5.5 patient at time screen and was transfused 1 unit of PRBCs prior to transfer.  Multiple attempts were made to transfer the patient to nearby facility is, however GI services are not available.   Hospital medicine has been asked to admit for further evaluation and treatment, including evaluation by Gastroenterology.     Overview/Hospital Course:  Admitted for upper GI bleeding with anemia requiring transfusion. Received 2U RBCs on 10/17 with appropriate response. EGD 10/17 was not remarkable for source of bleeding. GI planning small bowel enteroscopy.     Interval History: Anxiety overnight. Still having melena. No chest pain or shortness of breath. No other complaints.     Review of Systems   Respiratory: Negative for chest tightness, shortness of breath and wheezing.    Cardiovascular: Negative for chest pain, palpitations and leg swelling.   Gastrointestinal: Positive for blood in stool. Negative for abdominal pain, constipation, diarrhea, nausea and vomiting.   Genitourinary: Negative for difficulty urinating.     Objective:     Vital Signs (Most Recent):  Temp: 97.9 °F (36.6 °C) (10/18/19 0801)  Pulse: 67 (10/18/19 0801)  Resp: 16 (10/18/19 0801)  BP: (!) 128/59 (10/18/19 0801)  SpO2: 100 % (10/18/19 0801) Vital Signs (24h Range):  Temp:  [97.9 °F (36.6 °C)-98.3 °F (36.8 °C)] 97.9 °F (36.6 °C)  Pulse:  [60-76] 67  Resp:  [16-20] 16  SpO2:  [99 %-100 %] 100 %  BP: (114-191)/(59-87) 128/59     Weight: 96.5 kg (212 lb 11.9 oz)  Body mass index is 29.67 kg/m².    Intake/Output Summary (Last 24 hours) at 10/18/2019 1029  Last data filed at 10/18/2019 0735  Gross per 24 hour   Intake 290 ml   Output 550 ml   Net -260 ml      Physical Exam   Constitutional: He appears well-developed and well-nourished. No distress.   HENT:   Head: Normocephalic and atraumatic.   Mouth/Throat: Oropharynx is clear and moist.   Cardiovascular: Normal  rate, regular rhythm and intact distal pulses. Exam reveals no gallop and no friction rub.   Murmur (systolic) heard.  Pulmonary/Chest: Effort normal. No stridor. No respiratory distress. He has wheezes. He has no rales.   On O2 by nasal cannula   Abdominal: Soft. Bowel sounds are normal. He exhibits no distension and no mass. There is tenderness (with deep palpation in epigastrium). There is no guarding.   Musculoskeletal: He exhibits no edema.   Neurological: He is alert.   Skin: Skin is warm and dry. He is not diaphoretic.   Nursing note and vitals reviewed.      Significant Labs: All pertinent labs within the past 24 hours have been reviewed.    Significant Imaging: I have reviewed and interpreted all pertinent imaging results/findings within the past 24 hours.      Assessment/Plan:      * Acute upper GI bleed  Presented with melena and fatigue  Hgb 5.5 on admit  Received 2U RBCs on 10/17 with appropriate response  Has a history of recurrent upper GI bleeding with angioectasias and iron deficiency anemia from chronic blood loss  EGD 10/18 did not show source of bleeding  PPI transitioned from IV to PO  GI following and planning small bowel enteroscopy  Patient still having melena      Chronic respiratory failure with hypoxia  Secondary to COPD  Continue home O2      Coronary artery disease involving native coronary artery of native heart without angina pectoris  No angina on admit  No troponinemia  EKG shows paced rhythm  Remains stable  Hold antiplatelet agents  Continue statin  Continue ACEi, isosorbide, and BB      Hyperlipidemia  Lipids 8/2019 controlled  Continue home rosuvastatin      Essential hypertension  BP controlled  Continue home meds      Melena  See GI bleeding       Symptomatic anemia  See GI bleeding      COPD (chronic obstructive pulmonary disease)  O2 dependent at home  No signs of exacerbation   Not on any inhalers at home  Monitor      VTE Risk Mitigation (From admission, onward)          Ordered     Place HUBERT hose  Until discontinued      10/17/19 0351     Reason for No Pharmacological VTE Prophylaxis  Once      10/17/19 0351     Place sequential compression device  Until discontinued      10/17/19 0351     IP VTE HIGH RISK PATIENT  Once      10/17/19 0351                      Idalia Seth MD  Department of Hospital Medicine   [facility]

## 2019-10-18 NOTE — PLAN OF CARE
Problem: Fall Injury Risk  Goal: Absence of Fall and Fall-Related Injury  Outcome: Ongoing, Progressing  Intervention: Identify and Manage Contributors to Fall Injury Risk  Flowsheets (Taken 10/18/2019 0445)  Self-Care Promotion: independence encouraged; BADL personal routines maintained; BADL personal objects within reach  Medication Review/Management: medications reviewed  Intervention: Promote Injury-Free Environment  Flowsheets (Taken 10/18/2019 0445)  Safety Promotion/Fall Prevention: assistive device/personal item within reach; bed alarm set; instructed to call staff for mobility; side rails raised x 3; nonskid shoes/socks when out of bed; medications reviewed; Fall Risk reviewed with patient/family

## 2019-10-18 NOTE — PLAN OF CARE
Problem: Fall Injury Risk  Goal: Absence of Fall and Fall-Related Injury  Outcome: Ongoing, Progressing     Problem: Adjustment to Illness (Gastrointestinal Bleeding)  Goal: Optimal Coping with Acute Illness  Outcome: Ongoing, Progressing

## 2019-10-18 NOTE — PROGRESS NOTES
Bedside report given to oncoming nurse CORRINE Santiago noted. Pt lying in bed, Respirations even and unlabored. Safety maintained, Call light within reach.     24 hr chart check completed.

## 2019-10-18 NOTE — SUBJECTIVE & OBJECTIVE
Interval History: Anxiety overnight. Still having melena. No chest pain or shortness of breath. No other complaints.     Review of Systems   Respiratory: Negative for chest tightness, shortness of breath and wheezing.    Cardiovascular: Negative for chest pain, palpitations and leg swelling.   Gastrointestinal: Positive for blood in stool. Negative for abdominal pain, constipation, diarrhea, nausea and vomiting.   Genitourinary: Negative for difficulty urinating.     Objective:     Vital Signs (Most Recent):  Temp: 97.9 °F (36.6 °C) (10/18/19 0801)  Pulse: 67 (10/18/19 0801)  Resp: 16 (10/18/19 0801)  BP: (!) 128/59 (10/18/19 0801)  SpO2: 100 % (10/18/19 0801) Vital Signs (24h Range):  Temp:  [97.9 °F (36.6 °C)-98.3 °F (36.8 °C)] 97.9 °F (36.6 °C)  Pulse:  [60-76] 67  Resp:  [16-20] 16  SpO2:  [99 %-100 %] 100 %  BP: (114-191)/(59-87) 128/59     Weight: 96.5 kg (212 lb 11.9 oz)  Body mass index is 29.67 kg/m².    Intake/Output Summary (Last 24 hours) at 10/18/2019 1029  Last data filed at 10/18/2019 0735  Gross per 24 hour   Intake 290 ml   Output 550 ml   Net -260 ml      Physical Exam   Constitutional: He appears well-developed and well-nourished. No distress.   HENT:   Head: Normocephalic and atraumatic.   Mouth/Throat: Oropharynx is clear and moist.   Cardiovascular: Normal rate, regular rhythm and intact distal pulses. Exam reveals no gallop and no friction rub.   Murmur (systolic) heard.  Pulmonary/Chest: Effort normal. No stridor. No respiratory distress. He has wheezes. He has no rales.   On O2 by nasal cannula   Abdominal: Soft. Bowel sounds are normal. He exhibits no distension and no mass. There is tenderness (with deep palpation in epigastrium). There is no guarding.   Musculoskeletal: He exhibits no edema.   Neurological: He is alert.   Skin: Skin is warm and dry. He is not diaphoretic.   Nursing note and vitals reviewed.      Significant Labs: All pertinent labs within the past 24 hours have been  reviewed.    Significant Imaging: I have reviewed and interpreted all pertinent imaging results/findings within the past 24 hours.

## 2019-10-18 NOTE — HOSPITAL COURSE
Admitted for upper GI bleeding with anemia requiring transfusion. Received 2U RBCs on 10/17 with appropriate response. EGD 10/17 was not remarkable for source of bleeding. GI planning small bowel enteroscopy as outpatient. Hgb remains stable. He did have a dark stool, but it was not sticky like melena. Patient is feeling much better and asking to discharge. Stable for discharge to home. Follow up with GI for small bowel enteroscopy.

## 2019-10-18 NOTE — PROGRESS NOTES
[facility]  Gastroenterology  Progress Note    Patient Name: Kemar Perez  MRN: [id number]  Admission Date: 10/17/2019  Hospital Length of Stay: 1 days  Code Status: Full Code   Attending Provider: Idalia Seth MD  Primary Care Physician: César Priest MD  Principal Problem: Acute upper GI bleed    Subjective:     CC= Black stool, anemia     Interval History: Patient reports one black, tarry stool this morning.  No nausea or vomiting.  Tolerating diet.  No significant abdominal pain.  Reports anxiety overnight.      Review of systems:  General: Negative for fevers, chills.  Cardiovascular:  Negative for chest pain, shortness of breath     Objective:     Vital Signs (Most Recent):  Temp: 97.9 °F (36.6 °C) (10/18/19 0801)  Pulse: 67 (10/18/19 0801)  Resp: 16 (10/18/19 0801)  BP: (!) 128/59 (10/18/19 0801)  SpO2: 100 % (10/18/19 0801) Vital Signs (24h Range):  Temp:  [97.9 °F (36.6 °C)-98.3 °F (36.8 °C)] 97.9 °F (36.6 °C)  Pulse:  [60-76] 67  Resp:  [16-20] 16  SpO2:  [99 %-100 %] 100 %  BP: (114-191)/(59-87) 128/59     Physical examination:  GEN: Elderly male in no apparent distress   HENT: Normocephalic, anicteric sclera   Cardiovascular: Regular rate and rhythm. No murmurs appreciated.   Chest: Non-labored respirations. Breath sounds equal   Abdomen: Soft, NTND, normoactive BS  Psych: Appropriate mood and affect.   Extermities: No C/C/E. 2+ dorsalis pedis pulses bilaterally    Recent Labs   Lab 10/17/19  1123 10/17/19  1634 10/18/19  0448   WBC 7.86 7.11 7.14   HGB 8.1* 8.5* 8.5*   HCT 26.4* 27.4* 27.9*    257 255       Imaging:  CT abdomen/pelvis with contrast (10/16/19):  Impression: No acute findings.         Assessment:   85 year old male with a history of HTN, CAD s/p CABG, PCI, PAF, COPD, HLD and GI bleeding attributed to AVM's transferred from outside facility for symptomatic anemia and GI bleed.  EGD negative on 10/17.  Transfused 3 units PRBCs.  H&H stable.       Plan:   1.   Observation from GI standpoint.  2.  Repeat CBC in am and transfuse pRBCs prn.  3.  Increase iron supplementation to BID.  4.  Will need to follow-up with primary GI physician in the near future; consider evaluation with Dr. Romo for double balloon enterosocpy.  5.  Will follow along with you.      Christine Lynn PA-C  Gastroenterology  [facility]

## 2019-10-18 NOTE — ASSESSMENT & PLAN NOTE
No angina on admit  No troponinemia  EKG shows paced rhythm  Remains stable  Hold antiplatelet agents  Continue statin  Continue ACEi, isosorbide, and BB

## 2019-10-19 VITALS
SYSTOLIC BLOOD PRESSURE: 119 MMHG | WEIGHT: 185.19 LBS | RESPIRATION RATE: 17 BRPM | OXYGEN SATURATION: 99 % | BODY MASS INDEX: 25.93 KG/M2 | DIASTOLIC BLOOD PRESSURE: 58 MMHG | TEMPERATURE: 98 F | HEIGHT: 71 IN | HEART RATE: 66 BPM

## 2019-10-19 LAB
ANION GAP SERPL CALC-SCNC: 7 MMOL/L (ref 8–16)
BASOPHILS # BLD AUTO: 0.01 K/UL (ref 0–0.2)
BASOPHILS NFR BLD: 0.1 % (ref 0–1.9)
BUN SERPL-MCNC: 17 MG/DL (ref 8–23)
CALCIUM SERPL-MCNC: 8.3 MG/DL (ref 8.7–10.5)
CHLORIDE SERPL-SCNC: 104 MMOL/L (ref 95–110)
CO2 SERPL-SCNC: 27 MMOL/L (ref 23–29)
CREAT SERPL-MCNC: 1 MG/DL (ref 0.5–1.4)
DIFFERENTIAL METHOD: ABNORMAL
EOSINOPHIL # BLD AUTO: 0.2 K/UL (ref 0–0.5)
EOSINOPHIL NFR BLD: 3.1 % (ref 0–8)
ERYTHROCYTE [DISTWIDTH] IN BLOOD BY AUTOMATED COUNT: 14.7 % (ref 11.5–14.5)
EST. GFR  (AFRICAN AMERICAN): >60 ML/MIN/1.73 M^2
EST. GFR  (NON AFRICAN AMERICAN): >60 ML/MIN/1.73 M^2
GLUCOSE SERPL-MCNC: 100 MG/DL (ref 70–110)
HCT VFR BLD AUTO: 25.2 % (ref 40–54)
HGB BLD-MCNC: 7.6 G/DL (ref 14–18)
IMM GRANULOCYTES # BLD AUTO: 0.03 K/UL (ref 0–0.04)
IMM GRANULOCYTES NFR BLD AUTO: 0.4 % (ref 0–0.5)
LYMPHOCYTES # BLD AUTO: 1.4 K/UL (ref 1–4.8)
LYMPHOCYTES NFR BLD: 17.5 % (ref 18–48)
MCH RBC QN AUTO: 25.8 PG (ref 27–31)
MCHC RBC AUTO-ENTMCNC: 30.2 G/DL (ref 32–36)
MCV RBC AUTO: 85 FL (ref 82–98)
MONOCYTES # BLD AUTO: 0.7 K/UL (ref 0.3–1)
MONOCYTES NFR BLD: 8.8 % (ref 4–15)
NEUTROPHILS # BLD AUTO: 5.4 K/UL (ref 1.8–7.7)
NEUTROPHILS NFR BLD: 70.1 % (ref 38–73)
NRBC BLD-RTO: 0 /100 WBC
PLATELET # BLD AUTO: 245 K/UL (ref 150–350)
PMV BLD AUTO: 11.8 FL (ref 9.2–12.9)
POTASSIUM SERPL-SCNC: 3.7 MMOL/L (ref 3.5–5.1)
RBC # BLD AUTO: 2.95 M/UL (ref 4.6–6.2)
SODIUM SERPL-SCNC: 138 MMOL/L (ref 136–145)
WBC # BLD AUTO: 7.73 K/UL (ref 3.9–12.7)

## 2019-10-19 PROCEDURE — 97161 PT EVAL LOW COMPLEX 20 MIN: CPT

## 2019-10-19 PROCEDURE — 85025 COMPLETE CBC W/AUTO DIFF WBC: CPT

## 2019-10-19 PROCEDURE — 36415 COLL VENOUS BLD VENIPUNCTURE: CPT

## 2019-10-19 PROCEDURE — 27000221 HC OXYGEN, UP TO 24 HOURS

## 2019-10-19 PROCEDURE — 97165 OT EVAL LOW COMPLEX 30 MIN: CPT

## 2019-10-19 PROCEDURE — 80048 BASIC METABOLIC PNL TOTAL CA: CPT

## 2019-10-19 PROCEDURE — 25000003 PHARM REV CODE 250: Performed by: HOSPITALIST

## 2019-10-19 PROCEDURE — 94761 N-INVAS EAR/PLS OXIMETRY MLT: CPT

## 2019-10-19 RX ORDER — LISINOPRIL 5 MG/1
5 TABLET ORAL DAILY
Qty: 90 TABLET | Refills: 3 | Status: SHIPPED | OUTPATIENT
Start: 2019-10-19 | End: 2020-05-04

## 2019-10-19 RX ORDER — LISINOPRIL 5 MG/1
10 TABLET ORAL DAILY
Status: DISCONTINUED | OUTPATIENT
Start: 2019-10-20 | End: 2019-10-19 | Stop reason: HOSPADM

## 2019-10-19 RX ADMIN — ALPRAZOLAM 0.5 MG: 0.5 TABLET ORAL at 08:10

## 2019-10-19 RX ADMIN — PANTOPRAZOLE SODIUM 40 MG: 40 TABLET, DELAYED RELEASE ORAL at 08:10

## 2019-10-19 RX ADMIN — LOPERAMIDE HYDROCHLORIDE 2 MG: 2 CAPSULE ORAL at 08:10

## 2019-10-19 RX ADMIN — LISINOPRIL 20 MG: 20 TABLET ORAL at 08:10

## 2019-10-19 RX ADMIN — METOPROLOL SUCCINATE 25 MG: 25 TABLET, EXTENDED RELEASE ORAL at 08:10

## 2019-10-19 RX ADMIN — FUROSEMIDE 40 MG: 40 TABLET ORAL at 08:10

## 2019-10-19 RX ADMIN — ISOSORBIDE MONONITRATE 60 MG: 30 TABLET, EXTENDED RELEASE ORAL at 08:10

## 2019-10-19 NOTE — PROGRESS NOTES
[facility] CASE MANAGEMENT                  WRITTEN DISCHARGE INFORMATION      APPOINTMENTS AND RESOURCES TO HELP YOU MANAGE YOUR CARE AT HOME BASED ON YOUR PREFERENCES:  (If an appointment is not scheduled for you when you leave the hospital, call your doctor to schedule a follow up visit within a week)    Follow-up Information     Sebastien Worthy MD On 10/24/2019.    Specialty:  Gastroenterology  Why:  Outpatient Services, hospital follow up appointment on Thursday at 1:00  Contact information:  [address] [place] [address] [facility], [facility] [address]  [phone]             [facility].    Specialty:  DME Provider  Why:  DME  Contact information:  [address] [place] [id number]  [phone]                   Healthy Living Instructions to HELP MANAGE YOUR CARE AT HOME:  Things You are responsible for:  1.    Getting your prescriptions filled   2.    Taking your medications as directed, DO NOT MISS ANY DOSES!  3.    Following the diet and exercise recommended by your doctor  4.    Going to your follow-up doctor appointment. This is important because it allows the doctor to monitor your progress and determine if any changes need to made to your treatment plan.  5. If you have any questions about MANAGING YOUR CARE AT HOME Call the Nurse Care Line for 24/7 Assistance [phone]       Please answer any calls you may receive from [facility]. We want to continue to support you as you manage your healthcare needs. [facility] is happy to have the opportunity to serve you.      Thank you for choosing [facility] for your healthcare needs!  Your [facility] Case Management Team,

## 2019-10-19 NOTE — PROGRESS NOTES
Chief Complaint / Reason for Consult:  Anemia, melena    Patient reports black stool yesterday evening.    ROS:  No CP, SOB, F/C    Patient Vitals for the past 24 hrs:   BP Temp Temp src Pulse Resp SpO2 Weight   10/19/19 0759 -- -- -- -- -- 98 % --   10/19/19 0753 (!) 145/67 97.8 °F (36.6 °C) Oral 73 18 98 % --   10/19/19 0415 (!) 110/54 98.1 °F (36.7 °C) -- 60 18 100 % 84 kg (185 lb 3 oz)   10/18/19 2335 (!) 114/53 98 °F (36.7 °C) -- 64 20 99 % --   10/18/19 2029 -- -- -- -- -- 100 % --   10/18/19 2006 (!) 121/58 96.2 °F (35.7 °C) -- 63 20 100 % --   10/18/19 1539 (!) 92/53 98 °F (36.7 °C) Oral 65 16 98 % --   10/18/19 1415 (!) 91/54 -- -- 61 -- -- --   10/18/19 1157 (!) 73/44 97.6 °F (36.4 °C) Oral 62 16 99 % --       Physical Exam:  Gen - Well developed, well nourished, no apparent distress  HEENT - Anicteric  CV - S1, S2, no murmurs/rubs  Lungs - CTA-B, normal excursion  Abd - Soft, NT, ND, normal BS's, no HSM.  Ext - No c/c/e  Neuro - No asterixis    Labs:  Recent Labs   Lab 10/19/19  0406   WBC 7.73   RBC 2.95*   HGB 7.6*   HCT 25.2*      MCV 85   MCH 25.8*   MCHC 30.2*     Recent Labs   Lab 10/19/19  0406   CALCIUM 8.3*      K 3.7   CO2 27      BUN 17   CREATININE 1.0       Imaging:  Reviewed abdominal CT, no acute findings    Assessment:  This patient is a 85 y.o. male with:   1.  GI bleed-secondary to history of gastric and duodenal AVMs.  EGD on 10/17/2019 unrevealing.  Still with intermittent melena.  2.  Anemia-acute post hemorrhagic, improved with transfusions  3.  History of HTN, CAD (CABG/PCI), paroxysmal AFib, COPD, hyperlipidemia.....    Recommendations:  1.  Monitor for symptoms of GI bleeding.  2.  Transfusion per hospitalist  3.  Ppi daily  4.  Will need small bowel evaluation with capsule endoscopy or balloon enteroscopy (at [facility] with Dr. Romo) in near future.  5.  Okay for diet as tolerated.

## 2019-10-19 NOTE — PLAN OF CARE
"  Problem: Occupational Therapy Goal  Goal: Occupational Therapy Goal  Description  Goals to be met by: 19     Patient will increase functional independence with ADLs by performing:    LE Dressing with Supervision.  Grooming while standing at sink with Supervision.  Toileting from toilet with Supervision for hygiene and clothing management.   Step transfer with Stand-by Assistance  Toilet transfer to toilet with Stand-by Assistance.  Upper extremity exercise program x15 reps per handout, with independence.   Pt will independently implement 2/3 energy conservation techniques in order to safely complete ADLs.      Outcome: Ongoing, Progressing  Pt will continue to benefit from acute OT in order to increase safety awareness and independence with ADLs and all aspects of functional mobility. OT rec. HHOT with 24 hour supervision at d/c to regain PLOF and reduce risk of falls.    Pt demo'd decreased safety awareness with consistent prompting to reduce speed when completing ADLs/functional mobility, while pt reported feeling "dizzy" "weak" "I can't breathe." Vitals closely monitored with 3L on the entire session.     Sitting EOB initially: 110/56, 61 bpm.  Standin/56, 82 HR.     After ambulating to the bathroom and back to sitting EOB, 121/56, HR 61. Pt reported SOB, but O2 at 97% on 3L. Pt prompted with therapy demo of pursed lip breathing.   "

## 2019-10-19 NOTE — PT/OT/SLP EVAL
"Occupational Therapy   Evaluation    Name: Kemar Perez  MRN: [id number]  Admitting Diagnosis:  Acute upper GI bleed 2 Days Post-Op    Recommendations:     Discharge Recommendations: home health OT(with 24 hr supervision)  Discharge Equipment Recommendations:  walker, rolling  Barriers to discharge:  None    Assessment:     Kemar Perez is a 85 y.o. male with a medical diagnosis of Acute upper GI bleed.  He presents with good motivation to participate with therapy and good support system by girlfriend, Ms. Morales. Performance deficits affecting function: weakness, impaired self care skills, impaired balance, decreased safety awareness, impaired functional mobilty, impaired endurance, decreased upper extremity function, gait instability, decreased lower extremity function, impaired cardiopulmonary response to activity.      Pt will continue to benefit from acute OT in order to increase safety awareness and independence with ADLs and all aspects of functional mobility. OT rec. HHOT with 24 hour supervision at d/c to regain PLOF and reduce risk of falls.    Pt demo'd decreased safety awareness with consistent prompting to reduce speed when completing ADLs/functional mobility, while pt reported feeling "dizzy" "weak" "I can't breathe." Vitals closely monitored with 3L on the entire session.      Sitting EOB initially: 110/56, 61 bpm.  Standin/56, 82 HR.      After ambulating to the bathroom and back to sitting EOB, 121/56, HR 61. Pt reported SOB, but O2 at 97% on 3L. Pt prompted with therapy demo of pursed lip breathing.     Rehab Prognosis: Good; patient would benefit from acute skilled OT services to address these deficits and reach maximum level of function.       Plan:     Patient to be seen 5 x/week to address the above listed problems via self-care/home management, therapeutic activities, therapeutic exercises  · Plan of Care Expires: 19  · Plan of Care Reviewed with: patient, significant " "other(girlfriend, Carmen)    Subjective     Chief Complaint: feeling "woozy" sitting EOB; feeling like "I can't breathe and dizzy" while standing at the sink washing his hands after using the restroom   Patient/Family Comments/goals: to go home     Occupational Profile:  Living Environment: Pt will be staying with his girlfriend, Ms. Morales, for the next few days after discharge. She lives in a SS house with no concerns at entry. Bathroom set-up: walk-in shower with hand-held shower head, grab bars, and a bench. Once he returns home, he lives alone in a SS house with 2 TIO and 0 HR. [facility] and [facility] him an electric scooter. Bathroom set-up: tub/shower combo with bench.   Previous level of function: Pt was MOD I with ambulation, using a str c occasionally. Pt reports 1 recent fall at the eye doctor, slipping from a rolling chair. Pt is O2 dependent, wearing 3L consistently. Pt was completing ADLs with MOD I. Pt does minimal ambulation ~10 feet then requires a rest break.   Roles and Routines:    Equipment Used at Home:  bath bench, bedside commode, cane, straight, oxygen, grab bar(hand-held shower head )  Assistance upon Discharge: Ms. Morales (girlfriend- who also has functional limitations)    Pain/Comfort:  · Pain Rating 1: (c/o "woozy")    Patients cultural, spiritual, Yarsanism conflicts given the current situation: no    Objective:     Communicated with: nurseVaishnavi, prior to session.  Patient found sitting EOB with telemetry, peripheral IV, oxygen upon OT entry to room.    General Precautions: Standard, fall, respiratory, pacemaker   Orthopedic Precautions:N/A   Braces: N/A     Occupational Performance:    Bed Mobility:    · NT: pt found/left sitting EOB     Functional Mobility/Transfers:  · Patient completed Sit <> Stand Transfer with contact guard assistance  with  no assistive device   · Patient completed Toilet Transfer Step Transfer technique with " contact guard assistance with  no AD  · Functional Mobility: Pt ambulated within the room and bathroom with CGA. Pt required cueing d/t fast pacing of toilet t/f and ambulation to the sink while pt also complaining of dizziness and SOB. Pt directed to sit EOB and implement pursed lip breathing while vitals were monitored (see in assessment section above).     Activities of Daily Living:  · Grooming: contact guard assistance standing at the sink to wash his hands; cueing required to end this activity to sit down d/t dizziness and SOB  · Lower Body Dressing: stand by assistance to sonia/doff L sock seated EOB  · Toileting: supervision on commode    Cognitive/Visual Perceptual:  Cognitive/Psychosocial Skills:     -       Oriented to: Person, Place and Time   -       Follows Commands/attention:follows most multistep commands  -       Communication: clear/fluent  -       Memory: No Deficits noted  -       Safety awareness/insight to disability: impaired   -       Mood/Affect/Coping skills/emotional control: Cooperative and Pleasant  Visual/Perceptual:      -Intact  R/L discrimination & glassess    Physical Exam:  Balance:    -       seated: MOD I; standing: CGA   Postural examination/scapula alignment:    -       Rounded shoulders  -       Forward head  Skin integrity: Visible skin intact  Edema:  no BUE edema noted  Sensation:    -       Intact  light/touch BUE  Dominant hand:    -       Right  Upper Extremity Range of Motion:     -       Right Upper Extremity: WFL  -       Left Upper Extremity: WFL  Upper Extremity Strength:    -       Right Upper Extremity: WFL  -       Left Upper Extremity: WFL   Strength:    -       Right Upper Extremity: WFL  -       Left Upper Extremity: WFL  Fine Motor Coordination:    -       Intact  Left hand, manipulation of objects and Right hand, manipulation of objects  Gross motor coordination:   WFL    AMPAC 6 Click ADL:  AMPAC Total Score: 23    Treatment & Education:  Pt educated on  OT role/POC.   Importance of OOB activity with staff assistance.  Safety during functional t/f and mobility   White board updated   Multiple self-care tasks/functional mobility completed- assistance level noted above   All questions/concerns answered within OT scope of practice     Education:    Patient left sitting EOB with lap tray positioned in front of patient with lunch with all lines intact, call button in reach, bed alarm on, nurse, Vaishnavi, notified and Ms. Morales present    GOALS:   Multidisciplinary Problems     Occupational Therapy Goals        Problem: Occupational Therapy Goal    Goal Priority Disciplines Outcome Interventions   Occupational Therapy Goal     OT, PT/OT Ongoing, Progressing    Description:  Goals to be met by: 11/2/19     Patient will increase functional independence with ADLs by performing:    LE Dressing with Supervision.  Grooming while standing at sink with Supervision.  Toileting from toilet with Supervision for hygiene and clothing management.   Step transfer with Stand-by Assistance  Toilet transfer to toilet with Stand-by Assistance.  Upper extremity exercise program x15 reps per handout, with independence.                      History:     Past Medical History:   Diagnosis Date    Adenomatous polyps     Anemia     Aortic stenosis     Arthritis     AS (aortic stenosis)     Asthma     BBB (bundle branch block)     Cataract     COPD (chronic obstructive pulmonary disease)     Coronary artery disease     Encounter for blood transfusion     GERD (gastroesophageal reflux disease)     GI bleed     H/O food poisoning     H/O: GI bleed     Heart block AV second degree     Hemorrhoids     History of shingles     History of stomach ulcers     HLD (hyperlipidemia)     Hx of migraines     Hx of migraines     Hypertension     Macular degeneration of right eye     PAF (paroxysmal atrial fibrillation)     Pneumonia        Past Surgical History:   Procedure Laterality Date     CARDIAC ANGIOGRAM WITH STENTS      CARDIAC PACEMAKER PLACEMENT      CATARACT EXTRACTION      CATARACT SX Bilateral     COLONOSCOPY N/A 4/9/2019    Procedure: COLONOSCOPY;  Surgeon: Sebastien Worthy MD;  Location[facility];  Service: Endoscopy;  Laterality: N/A;    CORONARY ARTERY BYPASS GRAFT      CTR Right     ESOPHAGOGASTRODUODENOSCOPY N/A 4/8/2019    Procedure: EGD (ESOPHAGOGASTRODUODENOSCOPY);  Surgeon: Sebastien Worthy MD;  Location: [facility];  Service: Endoscopy;  Laterality: N/A;    JOINT REPLACEMENT      TOTAL KNEE ARTHROPLASTY Right     WRIST JOINT REMOVAL Right        Time Tracking:     OT Date of Treatment: 10/19/19  OT Start Time: 1137  OT Stop Time: 1156  OT Total Time (min): 19 min    Billable Minutes:Evaluation 19 min    Nithya Flores OT  10/19/2019

## 2019-10-19 NOTE — ASSESSMENT & PLAN NOTE
BP controlled  Continue home metoprolol.  Decrease lisinopril to 10 mg daily.  Discontinue HCTZ.

## 2019-10-19 NOTE — PLAN OF CARE
"   10/19/19 5351   Final Note   Assessment Type Final Discharge Note   Anticipated Discharge Disposition Home   Hospital Follow Up  Appt(s) scheduled? Yes   Discharge plans and expectations educations in teach back method with documentation complete? Yes   Right Care Referral Info   Post Acute Recommendation No Care   EDUCATION:  Mr Perez provided with educational information on GI.  Information reviewed and placed in :My Healthcare Packet" to be brought home for him to use as resource after discharge.  Information included:  signs and symptoms to look for and call the doctor if experiencing, and symptoms that may indicate a medical emergency: CALL 911.      All questions answered.  Teach back method used.    Patient stated, "Temp and diarrhea call the doctor".        "

## 2019-10-19 NOTE — PLAN OF CARE
10/19/19 1634   Post-Acute Status   Post-Acute Authorization Other   Other Status No Post-Acute Service Needs   Discharge Delays None known at this time   NurseVaishnavi notified that all CM needs are met

## 2019-10-19 NOTE — PT/OT/SLP EVAL
Physical Therapy Evaluation    Patient Name:  Kemar Perez   MRN:  [id number]    Recommendations:     Discharge Recommendations:  home health PT(24hr supervision)   Discharge Equipment Recommendations: walker, rolling   Barriers to discharge: Inaccessible home and Decreased caregiver support    Assessment:     Kemar Perez is a 85 y.o. male admitted with a medical diagnosis of Acute upper GI bleed.  He presents with the following impairments/functional limitations:  weakness, impaired functional mobilty, decreased safety awareness, impaired cardiopulmonary response to activity, impaired coordination, impaired endurance, gait instability, decreased coordination, impaired balance, impaired self care skills .    Rehab Prognosis: Fair; patient would benefit from acute skilled PT services to address these deficits and reach maximum level of function.    Recent Surgery: Procedure(s) (LRB):  EGD (ESOPHAGOGASTRODUODENOSCOPY) (N/A) 2 Days Post-Op    Plan:     During this hospitalization, patient to be seen 5 x/week to address the identified rehab impairments via gait training, therapeutic activities, therapeutic exercises and progress toward the following goals:    · Plan of Care Expires:  10/26/19    Subjective     Chief Complaint: Weakness, dizziness, SOB  Patient/Family Comments/goals: to go home  Pain/Comfort:  · Pain Rating 1: 0/10    Patients cultural, spiritual, Yarsanism conflicts given the current situation: no    Living Environment:  Pt lives alone, but will stay with his SO Rodrigo upon D/C.  [facility], no concerns  Prior to admission, patients level of function was Mod I with ambualtion short distances.  Equipment used at home: bath bench, bedside commode, cane, straight, oxygen, grab bar.  DME owned (not currently used): none.  Upon discharge, patient will have assistance from SO who also has functional limitations .    Objective:     Communicated with nsg prior to session.  Patient found seated at EOB with  telemetry, oxygen, peripheral IV  upon PT entry to room.    General Precautions: Standard, fall, pacemaker, respiratory   Orthopedic Precautions:N/A   Braces: N/A     Exams:  · Cognitive Exam:  Patient is oriented to Person, Place, Time and Situation  · Gross Motor Coordination:  WFL  · Postural Exam:  Patient presented with the following abnormalities:    · -       Rounded shoulders  · -       Forward head  · Sensation:    · -       Intact  light/touch B LE's  · Skin Integrity/Edema:      · -       Skin integrity: Visible skin intact  · RLE ROM: WFL  · RLE Strength: WFL  · LLE ROM: WFL  · LLE Strength: WFL   ·   Functional Mobility:  · Transfers:     · Sit to Stand:  contact guard assistance with no AD  · Gait: 2x12' with CGA/SBA  · Balance: Good sit, Good-/Fair+ stand      Therapeutic Activities and Exercises:   Educated pt on pursed lip breathing throughout treatment session.  Needs reinforcement.  Vitals WFL's throughout treatment session despite c/o.    AM-PAC 6 CLICK MOBILITY  Total Score:20     Patient left seated at EOB with all lines intact, call button in reach, nsg notified and SO present.    GOALS:   Multidisciplinary Problems     Physical Therapy Goals        Problem: Physical Therapy Goal    Goal Priority Disciplines Outcome Goal Variances Interventions   Physical Therapy Goal     PT, PT/OT      Description:  Goals to be met by: 10/26/2019     Patient will increase functional independence with mobility by performin. Sit to stand transfer with Modified [place]  2. Gait  x 50 feet with Modified [place] using Rolling Walker.   3. Lower extremity exercise program x10 reps per handout, with independence                      History:     Past Medical History:   Diagnosis Date    Adenomatous polyps     Anemia     Aortic stenosis     Arthritis     AS (aortic stenosis)     Asthma     BBB (bundle branch block)     Cataract     COPD (chronic obstructive pulmonary disease)     Coronary  artery disease     Encounter for blood transfusion     GERD (gastroesophageal reflux disease)     GI bleed     H/O food poisoning     H/O: GI bleed     Heart block AV second degree     Hemorrhoids     History of shingles     History of stomach ulcers     HLD (hyperlipidemia)     Hx of migraines     Hx of migraines     Hypertension     Macular degeneration of right eye     PAF (paroxysmal atrial fibrillation)     Pneumonia        Past Surgical History:   Procedure Laterality Date    CARDIAC ANGIOGRAM WITH STENTS      CARDIAC PACEMAKER PLACEMENT      CATARACT EXTRACTION      CATARACT SX Bilateral     COLONOSCOPY N/A 4/9/2019    Procedure: COLONOSCOPY;  Surgeon: Sebastien Worthy MD;  Location: [facility];  Service: Endoscopy;  Laterality: N/A;    CORONARY ARTERY BYPASS GRAFT      CTR Right     ESOPHAGOGASTRODUODENOSCOPY N/A 4/8/2019    Procedure: EGD (ESOPHAGOGASTRODUODENOSCOPY);  Surgeon: Sebastien Worthy MD;  Location: [facility];  Service: Endoscopy;  Laterality: N/A;    JOINT REPLACEMENT      TOTAL KNEE ARTHROPLASTY Right     WRIST JOINT REMOVAL Right        Time Tracking:     PT Received On: 10/19/19  PT Start Time: 1137     PT Stop Time: 1155  PT Total Time (min): 18 min     Billable Minutes: Evaluation 18 OT present      Roro Hdz, PT  10/19/2019

## 2019-10-19 NOTE — ASSESSMENT & PLAN NOTE
Presented with melena and fatigue  Hgb 5.5 on admit  Received 2U RBCs on 10/17 with appropriate response  Has a history of recurrent upper GI bleeding with angioectasias and iron deficiency anemia from chronic blood loss  EGD 10/18 did not show source of bleeding  PPI transitioned from IV to PO  GI following and planning small bowel enteroscopy as outpatient  Patient still having melena yesterday.  Hemoglobin remains stable.  Continue to monitor.

## 2019-10-19 NOTE — PROGRESS NOTES
[facility] Hospital Medicine  Progress Note    Patient Name: Kemar Perez  MRN: [id number]  Patient Class: IP- Inpatient   Admission Date: 10/17/2019  Length of Stay: 2 days  Attending Physician: Idalia Seth MD  Primary Care Provider: César Priest MD        Subjective:     Principal Problem:Acute upper GI bleed        HPI:  Kemar Perez is a 85 y.o. male that (in part)  has a past medical history of Adenomatous polyps, Anemia, Aortic stenosis, Arthritis, AS (aortic stenosis), Asthma, BBB (bundle branch block), Cataract, COPD (chronic obstructive pulmonary disease), Coronary artery disease, Encounter for blood transfusion, GERD (gastroesophageal reflux disease), GI bleed, H/O food poisoning, H/O: GI bleed, Heart block AV second degree, Hemorrhoids, History of shingles, History of stomach ulcers, HLD (hyperlipidemia), migraines, migraines, Hypertension, Macular degeneration of right eye, PAF (paroxysmal atrial fibrillation), and Pneumonia.  has a past surgical history that includes Coronary artery bypass graft; Joint replacement; Total knee arthroplasty (Right); CATARACT SX (Bilateral); CARDIAC ANGIOGRAM WITH STENTS; CTR (Right); WRIST JOINT REMOVAL (Right); Cardiac pacemaker placement; Cataract extraction; Esophagogastroduodenoscopy (N/A, 4/8/2019); and Colonoscopy (N/A, 4/9/2019). Presents to [facility] as a transfer patient from the [place]  Emergency Department where he presented with generalized fatigue and malaise associated with shortness of breath and dark stool.  Positive epigastric pain, weakness, fatigue, and lightheadedness.  This is a recurrent issue for him.  He had endoscopy in April of this year due to similar symptoms.  Is found to be anemic from an upper GI bleeding from angiodysplasia of the duodenum and ulcer disease. Denies fever chills.  Denies syncope, chest pain, unilateral weakness.  Denies hematemesis.    In the emergency department  routine laboratory studies revealed evidence of significant anemia with hemoglobin of 5.5 patient at time screen and was transfused 1 unit of PRBCs prior to transfer.  Multiple attempts were made to transfer the patient to nearby facility is, however GI services are not available.   Hospital medicine has been asked to admit for further evaluation and treatment, including evaluation by Gastroenterology.     Overview/Hospital Course:  Admitted for upper GI bleeding with anemia requiring transfusion. Received 2U RBCs on 10/17 with appropriate response. EGD 10/17 was not remarkable for source of bleeding. GI planning small bowel enteroscopy as outpatient.  Patient still having melena.    Interval History:  Complains about anxiety this morning.  Says that he is lightheaded when he tries to stand up.  Says that he is weak since having been admitted to the hospital.  He has not had a bowel movement today, but his last bowel movement last night was still dark.    Review of Systems   Respiratory: Negative for chest tightness, shortness of breath and wheezing.    Cardiovascular: Negative for chest pain, palpitations and leg swelling.   Gastrointestinal: Negative for abdominal pain, blood in stool, constipation, diarrhea, nausea and vomiting.   Genitourinary: Negative for difficulty urinating.     Objective:     Vital Signs (Most Recent):  Temp: 97.8 °F (36.6 °C) (10/19/19 0753)  Pulse: 60 (10/19/19 1053)  Resp: 18 (10/19/19 0753)  BP: (!) 104/51 (10/19/19 1053)  SpO2: 98 % (10/19/19 0759) Vital Signs (24h Range):  Temp:  [96.2 °F (35.7 °C)-98.1 °F (36.7 °C)] 97.8 °F (36.6 °C)  Pulse:  [60-73] 60  Resp:  [16-20] 18  SpO2:  [98 %-100 %] 98 %  BP: ()/(44-67) 104/51     Weight: 84 kg (185 lb 3 oz)  Body mass index is 25.83 kg/m².    Intake/Output Summary (Last 24 hours) at 10/19/2019 1057  Last data filed at 10/19/2019 0753  Gross per 24 hour   Intake 1540 ml   Output 1076 ml   Net 464 ml      Physical Exam    Constitutional: He appears well-developed and well-nourished. No distress.   HENT:   Head: Normocephalic and atraumatic.   Mouth/Throat: Oropharynx is clear and moist.   Cardiovascular: Normal rate, regular rhythm and intact distal pulses. Exam reveals no gallop and no friction rub.   Murmur (systolic) heard.  Pulmonary/Chest: Effort normal. No stridor. No respiratory distress. He has no wheezes. He has no rales.   On O2 by nasal cannula   Abdominal: Soft. Bowel sounds are normal. He exhibits no distension and no mass. There is no tenderness. There is no guarding.   Musculoskeletal: He exhibits no edema.   Neurological: He is alert.   Skin: Skin is warm and dry. He is not diaphoretic.   Nursing note and vitals reviewed.      Significant Labs: All pertinent labs within the past 24 hours have been reviewed.    Significant Imaging: I have reviewed and interpreted all pertinent imaging results/findings within the past 24 hours.      Assessment/Plan:      * Acute upper GI bleed  Presented with melena and fatigue  Hgb 5.5 on admit  Received 2U RBCs on 10/17 with appropriate response  Has a history of recurrent upper GI bleeding with angioectasias and iron deficiency anemia from chronic blood loss  EGD 10/18 did not show source of bleeding  PPI transitioned from IV to PO  GI following and planning small bowel enteroscopy as outpatient  Patient still having melena yesterday.  Hemoglobin remains stable.  Continue to monitor.      Chronic respiratory failure with hypoxia  Secondary to COPD  Continue home O2      Coronary artery disease involving native coronary artery of native heart without angina pectoris  No angina on admit  No troponinemia  EKG shows paced rhythm  Remains stable  Hold antiplatelet agents  Continue statin  Continue ACEi, isosorbide, and BB      Hyperlipidemia  Lipids 8/2019 controlled  Continue home rosuvastatin      Essential hypertension  BP controlled  Continue home metoprolol.  Decrease lisinopril to  10 mg daily.  Discontinue HCTZ.      Melena  See GI bleeding       Symptomatic anemia  See GI bleeding      COPD (chronic obstructive pulmonary disease)  O2 dependent at home  No signs of exacerbation   Not on any inhalers at home  Monitor      VTE Risk Mitigation (From admission, onward)         Ordered     Place HUBERT hose  Until discontinued      10/17/19 0351     Reason for No Pharmacological VTE Prophylaxis  Once      10/17/19 0351     Place sequential compression device  Until discontinued      10/17/19 0351     IP VTE HIGH RISK PATIENT  Once      10/17/19 0351              PT OT consult today for weakness        Idalia Seth MD  Department of Hospital Medicine   [facility]

## 2019-10-19 NOTE — PROGRESS NOTES
Orthostatic vital signs       10/19/19 1053 10/19/19 1059 10/19/19 1102   Vital Signs   Pulse 60 60 75   BP (!) 104/51 (!) 106/51 (!) 88/53   MAP (mmHg) 73 74 64   Patient Position Lying Sitting Standing

## 2019-10-19 NOTE — SUBJECTIVE & OBJECTIVE
Interval History:  Complains about anxiety this morning.  Says that he is lightheaded when he tries to stand up.  Says that he is weak since having been admitted to the hospital.  He has not had a bowel movement today, but his last bowel movement last night was still dark.    Review of Systems   Respiratory: Negative for chest tightness, shortness of breath and wheezing.    Cardiovascular: Negative for chest pain, palpitations and leg swelling.   Gastrointestinal: Negative for abdominal pain, blood in stool, constipation, diarrhea, nausea and vomiting.   Genitourinary: Negative for difficulty urinating.     Objective:     Vital Signs (Most Recent):  Temp: 97.8 °F (36.6 °C) (10/19/19 0753)  Pulse: 60 (10/19/19 1053)  Resp: 18 (10/19/19 0753)  BP: (!) 104/51 (10/19/19 1053)  SpO2: 98 % (10/19/19 0759) Vital Signs (24h Range):  Temp:  [96.2 °F (35.7 °C)-98.1 °F (36.7 °C)] 97.8 °F (36.6 °C)  Pulse:  [60-73] 60  Resp:  [16-20] 18  SpO2:  [98 %-100 %] 98 %  BP: ()/(44-67) 104/51     Weight: 84 kg (185 lb 3 oz)  Body mass index is 25.83 kg/m².    Intake/Output Summary (Last 24 hours) at 10/19/2019 1057  Last data filed at 10/19/2019 0753  Gross per 24 hour   Intake 1540 ml   Output 1076 ml   Net 464 ml      Physical Exam   Constitutional: He appears well-developed and well-nourished. No distress.   HENT:   Head: Normocephalic and atraumatic.   Mouth/Throat: Oropharynx is clear and moist.   Cardiovascular: Normal rate, regular rhythm and intact distal pulses. Exam reveals no gallop and no friction rub.   Murmur (systolic) heard.  Pulmonary/Chest: Effort normal. No stridor. No respiratory distress. He has no wheezes. He has no rales.   On O2 by nasal cannula   Abdominal: Soft. Bowel sounds are normal. He exhibits no distension and no mass. There is no tenderness. There is no guarding.   Musculoskeletal: He exhibits no edema.   Neurological: He is alert.   Skin: Skin is warm and dry. He is not diaphoretic.   Nursing  note and vitals reviewed.      Significant Labs: All pertinent labs within the past 24 hours have been reviewed.    Significant Imaging: I have reviewed and interpreted all pertinent imaging results/findings within the past 24 hours.

## 2019-10-20 NOTE — PT/OT/SLP DISCHARGE
Occupational Therapy Discharge Summary    Kemar Perez  MRN: [id number]   Principal Problem: Acute upper GI bleed      Patient Discharged from acute Occupational Therapy on 10/19/19.  Please refer to prior OT notes for functional status.    Assessment:      Patient appropriate for care in another setting.    Objective:     GOALS:   Multidisciplinary Problems     Occupational Therapy Goals     Not on file          Multidisciplinary Problems (Resolved)        Problem: Occupational Therapy Goal    Goal Priority Disciplines Outcome Interventions   Occupational Therapy Goal   (Resolved)     OT, PT/OT Met    Description:  Goals to be met by: 11/2/19     Patient will increase functional independence with ADLs by performing:    LE Dressing with Supervision.  Grooming while standing at sink with Supervision.  Toileting from toilet with Supervision for hygiene and clothing management.   Step transfer with Stand-by Assistance  Toilet transfer to toilet with Stand-by Assistance.  Upper extremity exercise program x15 reps per handout, with independence.                      Reasons for Discontinuation of Therapy Services  Transfer to alternate level of care.      Plan:     Patient Discharged to: OT rec. HHOT with 24 hr SUP; pt d/c without any further services.     Nithya Flores OT  10/20/2019

## 2019-10-22 DIAGNOSIS — R06.09 DYSPNEA ON EXERTION: Primary | ICD-10-CM

## 2019-10-22 NOTE — PHYSICIAN QUERY
PT Name: Kemar Perez  MR #: [id number]     Physician Query Form - Etiology of Condition Clarification      CDS/: Monisha Enciso RN, CDS               Contact information: [email or web address]                                                                                                                        [phone]  This form is a permanent document in the medical record.     Query Date: October 22, 2019    By submitting this query, we are merely seeking further clarification of documentation.  Please utilize your independent clinical judgment when addressing the question(s) below.     The medical record contains the following:    Findings Supporting Clinical Information Location in Medical Record   PRINCIPAL PROBLEM:  Acute upper GI bleed  Presents to [facility] as a transfer patient from the [place]  Emergency Department where he presented with generalized fatigue and malaise associated with shortness of breath and dark stool.  Positive epigastric pain, weakness, fatigue, and lightheadedness.  This is a recurrent issue for him.  He had endoscopy in April of this year due to similar symptoms.  Is found to be anemic from an upper GI bleeding from angiodysplasia of the duodenum and ulcer disease    Symptomatic anemia secondary to Melena secondary to Acute Upper GI bleeding  History of gastric ulcers and duodenal hemorrhage due to angiodysplasia of the duodenum on previous EGD earlier this year.  Concern for recurrent bleed.  · As evidence by physical exam and positive occult blood testing  · Possible etiology includes: hemorrhagic gastritis, ulcer (duodenal / gastric), gastric varices, malignancy, Vale Richards tear  · Give Protonix 80 mg IV bolus followed by 8 mg per hour IV drip    Findings:       A small hiatal hernia was present.       The entire examined stomach was normal.       The examined duodenum was normal.    Assessment:  This patient is a 85 y.o. male with:    1.  GI bleed-secondary to history of gastric and duodenal AVMs.  EGD on 10/17/2019 unrevealing.  Still with intermittent melena. H&P 10/17                      H&P 10/17                          EGD Report 10/17          GI PN 10/19     Please document your best medical opinion regarding the suspected etiology of __ GI Bleed___ for which treatment is/was directed.     Provider use only     [   ] Duodenal AVMs    [   ] Other suspected etiology, please specify ________________       [ x ] Clinically Undetermined     Please document in your progress notes daily for the duration of treatment, until resolved, and include in your discharge summary.

## 2019-10-31 ENCOUNTER — TELEPHONE (OUTPATIENT)
Dept: NEUROLOGY | Facility: HOSPITAL | Age: 84
End: 2019-10-31

## 2019-10-31 NOTE — TELEPHONE ENCOUNTER
Contacted pt to schedule Referral appt with Dr Loco FLORENTINO. Pt refuses appt. He will contact his Doctor and get Referral with a provider close to pt home. Informed pt Referral order will be scan in if he decided to be seen by Dr. Romo.

## 2019-11-04 ENCOUNTER — OFFICE VISIT (OUTPATIENT)
Dept: HEMATOLOGY/ONCOLOGY | Facility: CLINIC | Age: 84
End: 2019-11-04
Payer: MEDICARE

## 2019-11-04 VITALS
WEIGHT: 175.13 LBS | TEMPERATURE: 98 F | DIASTOLIC BLOOD PRESSURE: 64 MMHG | BODY MASS INDEX: 24.52 KG/M2 | RESPIRATION RATE: 18 BRPM | HEIGHT: 71 IN | SYSTOLIC BLOOD PRESSURE: 122 MMHG | OXYGEN SATURATION: 97 % | HEART RATE: 68 BPM

## 2019-11-04 DIAGNOSIS — Z71.89 ADVANCE CARE PLANNING: ICD-10-CM

## 2019-11-04 DIAGNOSIS — D50.0 IRON DEFICIENCY ANEMIA DUE TO CHRONIC BLOOD LOSS: Primary | ICD-10-CM

## 2019-11-04 DIAGNOSIS — K31.811 DUODENAL HEMORRHAGE DUE TO ANGIODYSPLASIA OF DUODENUM: ICD-10-CM

## 2019-11-04 PROCEDURE — 99213 OFFICE O/P EST LOW 20 MIN: CPT | Mod: PBBFAC,PO,25 | Performed by: INTERNAL MEDICINE

## 2019-11-04 PROCEDURE — 99497 ADVNCD CARE PLAN 30 MIN: CPT | Mod: PBBFAC,PO | Performed by: INTERNAL MEDICINE

## 2019-11-04 PROCEDURE — 99999 PR PBB SHADOW E&M-EST. PATIENT-LVL III: CPT | Mod: PBBFAC,,, | Performed by: INTERNAL MEDICINE

## 2019-11-04 PROCEDURE — 99204 OFFICE O/P NEW MOD 45 MIN: CPT | Mod: S$PBB,,, | Performed by: INTERNAL MEDICINE

## 2019-11-04 PROCEDURE — 99204 PR OFFICE/OUTPT VISIT, NEW, LEVL IV, 45-59 MIN: ICD-10-PCS | Mod: S$PBB,,, | Performed by: INTERNAL MEDICINE

## 2019-11-04 PROCEDURE — 99497 ADVNCD CARE PLAN 30 MIN: CPT | Mod: S$PBB,,, | Performed by: INTERNAL MEDICINE

## 2019-11-04 PROCEDURE — 99999 PR PBB SHADOW E&M-EST. PATIENT-LVL III: ICD-10-PCS | Mod: PBBFAC,,, | Performed by: INTERNAL MEDICINE

## 2019-11-04 PROCEDURE — 99497 PR ADVNCD CARE PLAN 30 MIN: ICD-10-PCS | Mod: S$PBB,,, | Performed by: INTERNAL MEDICINE

## 2019-11-04 NOTE — PROGRESS NOTES
PATIENT: Kemar Perez  MRN: [id number]  DATE: 11/3/2019    Diagnosis:   1. Iron deficiency anemia due to chronic blood loss    2. 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 Advance care planning      Chief Complaint: iron deficiency anemia    Subjective:    History of Present Illness: Mr. Perez is a 85 y.o. male who presents for evaluation and management of iron deficiency anemia. He is referred by gastroenterologist Dr. Cohen.    - labs dating back to January 2018 reveal a moderate normocytic anemia. Iron studies reveal a decreased ferritin/iron/saturated iron.  - it appears that he likely has angioectasias in his small intestines per his most recent note by Dr. Cohen (10/9/19).  - since April 2019, he has received 5 units of blood due to recurrent, symptomatic anemia.  - today, he endorses fatigue, dyspnea upon exertion, generalized weakness, ice eating. He denies chest pain, nausea, vomiting, diarrhea, constipation.    Past medical, surgical, family, and social histories have been reviewed and updated below.    Past Medical History:   Past Medical History:   Diagnosis Date    Adenomatous polyps     Anemia     Aortic stenosis     Arthritis     AS (aortic stenosis)     Asthma     BBB (bundle branch block)     Cataract     COPD (chronic obstructive pulmonary disease)     Coronary artery disease     Encounter for blood transfusion     GERD (gastroesophageal reflux disease)     GI bleed     H/O food poisoning     H/O: GI bleed     Heart block AV second degree     Hemorrhoids     History of shingles     History of stomach ulcers     HLD (hyperlipidemia)     Hx of migraines     Hx of migraines     Hypertension     Macular degeneration of right eye     PAF (paroxysmal atrial fibrillation)     Pneumonia        Past Surgical History:   Past Surgical History:   Procedure Laterality Date    CARDIAC ANGIOGRAM WITH STENTS      CARDIAC PACEMAKER PLACEMENT      CATARACT EXTRACTION       CATARACT SX Bilateral     COLONOSCOPY N/A 4/9/2019    Procedure: COLONOSCOPY;  Surgeon: Sebastien Worhty MD;  [facility];  Service: Endoscopy;  Laterality: N/A;    CORONARY ARTERY BYPASS GRAFT      CTR Right     ESOPHAGOGASTRODUODENOSCOPY N/A 4/8/2019    Procedure: EGD (ESOPHAGOGASTRODUODENOSCOPY);  Surgeon: Sebastien Worthy MD;  [facility];  Service: Endoscopy;  Laterality: N/A;    ESOPHAGOGASTRODUODENOSCOPY N/A 10/17/2019    Procedure: EGD (ESOPHAGOGASTRODUODENOSCOPY);  Surgeon: Fabrice Helm MD;  Location: [facility] ENDO;  Service: Endoscopy;  Laterality: N/A;  NPO, bed, 2uprbc since last night    JOINT REPLACEMENT      TOTAL KNEE ARTHROPLASTY Right     WRIST JOINT REMOVAL Right        Family History:   Family History   Problem Relation Age of Onset    Heart disease Paternal Grandmother     Hypertension Paternal Grandmother     Heart disease Paternal Grandfather     Hypertension Paternal Grandfather     Heart disease Father     Hypertension Father        Social History:  reports that he quit smoking about 36 years ago. His smoking use included cigarettes. He started smoking about 67 years ago. He smoked 1.00 pack per day. He has never used smokeless tobacco. He reports that he does not drink alcohol or use drugs.    Allergies:  Review of patient's allergies indicates:   Allergen Reactions    Dexamethasone Other (See Comments)     High blood pressure    Mobic [meloxicam] Other (See Comments)     Bleeds easily    Nsaids (non-steroidal anti-inflammatory drug) Other (See Comments)     Bleeding     Spiriva respimat [tiotropium bromide] Other (See Comments)     Dry mouth       Medications:  Current Outpatient Medications   Medication Sig Dispense Refill    ALPRAZolam (XANAX) 0.5 MG tablet Take 0.5 mg by mouth 2 (two) times daily.  0    budesonide-formoterol 160-4.5 mcg (SYMBICORT) 160-4.5 mcg/actuation HFAA Inhale 1 puff into the lungs every 12 (twelve) hours.  "Controller      cyanocobalamin, vitamin B-12, 1,000 mcg/mL Kit cyanocobalamin (vit B-12) 1,000 mcg/mL injection solution   INJECT ONE ML INTO THE MUSCLES EVERY MONTH      furosemide (LASIX) 40 MG tablet Take 40 mg by mouth once daily.      isosorbide mononitrate (IMDUR) 60 MG 24 hr tablet Take 60 mg by mouth once daily.       lisinopril (PRINIVIL,ZESTRIL) 5 MG tablet Take 1 tablet (5 mg total) by mouth once daily. 90 tablet 3    metoprolol succinate (TOPROL-XL) 25 MG 24 hr tablet Take 25 mg by mouth once daily.  11    potassium chloride SA (K-DUR,KLOR-CON) 20 MEQ tablet Take 20 mEq by mouth 2 (two) times daily.  11    ranitidine (ZANTAC) 150 MG tablet Take 150 mg by mouth 2 (two) times daily.  11    rOPINIRole (REQUIP) 1 MG tablet ropinirole 1 mg tablet      rosuvastatin (CRESTOR) 20 MG tablet 20 mg.   11    traZODone (DESYREL) 100 MG tablet 200 mg.   3     No current facility-administered medications for this visit.        Review of Systems   Constitutional: Positive for fatigue.   HENT: Negative for sore throat.    Eyes: Negative for visual disturbance.   Respiratory: Positive for shortness of breath.    Cardiovascular: Negative for chest pain.   Gastrointestinal: Negative for abdominal pain.   Genitourinary: Negative for dysuria.   Musculoskeletal: Negative for back pain.   Skin: Negative for rash.   Neurological: Positive for weakness. Negative for headaches.   Hematological: Negative for adenopathy.   Psychiatric/Behavioral: The patient is not nervous/anxious.        ECOG Performance Status:   ECOG SCORE 1       Objective:      Vitals:   Vitals:    11/04/19 1329   BP: 122/64   Pulse: 68   Resp: 18   Temp: 97.9 °F (36.6 °C)   TempSrc: Oral   SpO2: 97%   Weight: 79.4 kg (175 lb 1.6 oz)   Height: 5' 10.5" (1.791 m)     BMI: Body mass index is 24.77 kg/m².    Physical Exam   Constitutional: He is oriented to person, place, and time. He appears well-developed and well-nourished.   Fatigued, in wheelchair "   HENT:   Head: Normocephalic and atraumatic.   On supplemental oxygen via nasal cannula   Eyes: Pupils are equal, round, and reactive to light. EOM are normal.   Neck: Normal range of motion. Neck supple.   Cardiovascular: Normal rate and regular rhythm.   Pulmonary/Chest: Effort normal and breath sounds normal.   Abdominal: Soft. Bowel sounds are normal.   Musculoskeletal: Normal range of motion. He exhibits no edema.   Neurological: He is alert and oriented to person, place, and time.   Skin: Skin is warm and dry.   Psychiatric: He has a normal mood and affect. His behavior is normal. Judgment and thought content normal.   Nursing note and vitals reviewed.    Laboratory Data:  Labs have been reviewed.    Lab Results   Component Value Date    WBC 7.73 10/19/2019    HGB 7.6 (L) 10/19/2019    HCT 25.2 (L) 10/19/2019    MCV 85 10/19/2019     10/19/2019         Imaging:     Upper GI endoscopy (10/17/19):  - Small hiatal hernia.  - Normal stomach.  - Normal examined duodenum.  - No specimens collected.    Colonoscopy (4/9/19):  - Diverticulosis in the sigmoid colon.  - The examination was otherwise normal.  - No specimens collected.    CT abdomen/pelvis (10/16/19):  The visualized portion of the base of the lungs is significant for mild bilateral basilar atelectatic versus fibrotic change.  The visualized portion of the heart is significant for calcification of the right coronary artery.  The stomach and spleen are unremarkable.  There is fatty atrophy of the pancreas.  The liver, gallbladder, and adrenal glands are unremarkable.  The visualized portion of the aorta is significant for extensive scattered plaque which extends into its branches.  The left kidney contains a subcentimeter hypodensity too small to characterize.  The right kidney is unremarkable.  The bladder is unremarkable.  The bowel is unremarkable.  The appendix has a normal appearance.  The osseous structures demonstrate degenerative  change    Assessment:       1. Iron deficiency anemia due to chronic blood loss    2. 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 Advance care planning           Plan:     1. Iron deficiency anemia due to chronic blood loss  - I have reviewed his labs  - labs dating back to January 2018 reveal a moderate normocytic anemia. Iron studies reveal a decreased ferritin/iron/saturated iron. These findings are consistent with iron deficiency anemia.  - given the severity of his anemia and iron deficiency, I recommend ferric carboxymaltose x 2 doses.  - return to clinic in 7-8 weeks with repeat iron studies.    2. Angiodysplasia of duodenum  - Dr. Cohen feels his iron deficiency is secondary to angiodysplasias in his small intestines  - defer further management to gastroenterology      3. Advance Care Planning     Power of   I initiated the process of advance care planning today and explained the importance of this process to the patient.  I introduced the concept of advance directives to the patient, as well. Then the patient received detailed information about the importance of designating a Health Care Power of  (HCPOA). He was also instructed to communicate with this person about their wishes for future healthcare, should he become sick and lose decision-making capacity. The patient has not previously appointed a HCPOA. After our discussion, the patient has decided to complete a HCPOA and has appointed his friend John Wei ([phone]). I spent a total time of 16 minutes discussing this issue with the patient.          - return to clinic in 7-8 weeks with repeat iron studies.    Arnold Trinidad M.D.  Hematology/Oncology  [facility] - [address], [address]  [place] [address]  Phone: [phone]  Fax: [phone]

## 2019-11-04 NOTE — LETTER
November 4, 2019      Patti Cohen MD  [address]  [address]   [facility] [address]           [facility] Hematology Oncology  [address]   [facility] [address]  Phone: [phone]  Fax: [phone]          Patient: Kemar Perez   MR Number: [id number]   YOB: 1934   Date of Visit: 11/4/2019       Dear Dr. Patti Cohen:    Thank you for referring Kemar Perez to me for evaluation. Attached you will find relevant portions of my assessment and plan of care.    If you have questions, please do not hesitate to call me. I look forward to following Kemar Perez along with you.    Sincerely,    Arnold Trinidad MD    Enclosure  CC:  No Recipients    If you would like to receive this communication electronically, please contact [email or web address] or [phone] to request more information on [employer] access.    For providers and/or their staff who would like to refer a patient to [facility], please contact us through our one-stop-shop provider referral line, [facility], at [phone].    If you feel you have received this communication in error or would no longer like to receive these types of communications, please e-mail [email or web address]

## 2019-11-07 ENCOUNTER — TELEPHONE (OUTPATIENT)
Dept: HEMATOLOGY/ONCOLOGY | Facility: CLINIC | Age: 84
End: 2019-11-07

## 2019-12-30 ENCOUNTER — TELEPHONE (OUTPATIENT)
Dept: NEUROLOGY | Facility: HOSPITAL | Age: 84
End: 2019-12-30

## 2020-01-15 ENCOUNTER — OFFICE VISIT (OUTPATIENT)
Dept: INTERNAL MEDICINE | Facility: CLINIC | Age: 85
End: 2020-01-15
Payer: MEDICARE

## 2020-01-15 VITALS
WEIGHT: 165.38 LBS | RESPIRATION RATE: 20 BRPM | HEART RATE: 74 BPM | OXYGEN SATURATION: 98 % | HEIGHT: 71 IN | DIASTOLIC BLOOD PRESSURE: 52 MMHG | BODY MASS INDEX: 23.15 KG/M2 | SYSTOLIC BLOOD PRESSURE: 111 MMHG

## 2020-01-15 DIAGNOSIS — G25.81 RLS (RESTLESS LEGS SYNDROME): ICD-10-CM

## 2020-01-15 DIAGNOSIS — I35.0 NONRHEUMATIC AORTIC VALVE STENOSIS: ICD-10-CM

## 2020-01-15 DIAGNOSIS — Z23 NEED FOR VACCINATION: ICD-10-CM

## 2020-01-15 DIAGNOSIS — J96.11 CHRONIC RESPIRATORY FAILURE WITH HYPOXIA: ICD-10-CM

## 2020-01-15 DIAGNOSIS — J44.9 CHRONIC OBSTRUCTIVE PULMONARY DISEASE, UNSPECIFIED COPD TYPE: ICD-10-CM

## 2020-01-15 DIAGNOSIS — I10 ESSENTIAL HYPERTENSION: ICD-10-CM

## 2020-01-15 DIAGNOSIS — I25.10 CORONARY ARTERY DISEASE INVOLVING NATIVE CORONARY ARTERY OF NATIVE HEART WITHOUT ANGINA PECTORIS: Primary | ICD-10-CM

## 2020-01-15 DIAGNOSIS — I50.32 CHRONIC DIASTOLIC CHF (CONGESTIVE HEART FAILURE): ICD-10-CM

## 2020-01-15 DIAGNOSIS — D50.0 IRON DEFICIENCY ANEMIA DUE TO CHRONIC BLOOD LOSS: ICD-10-CM

## 2020-01-15 DIAGNOSIS — F41.9 ANXIETY: ICD-10-CM

## 2020-01-15 DIAGNOSIS — K31.811 DUODENAL HEMORRHAGE DUE TO ANGIODYSPLASIA OF DUODENUM: ICD-10-CM

## 2020-01-15 DIAGNOSIS — E78.5 HYPERLIPIDEMIA, UNSPECIFIED HYPERLIPIDEMIA TYPE: ICD-10-CM

## 2020-01-15 PROBLEM — K92.2 ACUTE UPPER GI BLEED: Status: RESOLVED | Noted: 2019-10-17 | Resolved: 2020-01-15

## 2020-01-15 PROCEDURE — 99213 OFFICE O/P EST LOW 20 MIN: CPT | Mod: PBBFAC,25 | Performed by: INTERNAL MEDICINE

## 2020-01-15 PROCEDURE — 99999 PR PBB SHADOW E&M-EST. PATIENT-LVL III: CPT | Mod: PBBFAC,,, | Performed by: INTERNAL MEDICINE

## 2020-01-15 PROCEDURE — 99999 TD VACCINE GREATER THAN OR EQUAL TO 7YO PRESERVATIVE FREE IM: ICD-10-PCS | Mod: PBBFAC,,,

## 2020-01-15 PROCEDURE — 99204 OFFICE O/P NEW MOD 45 MIN: CPT | Mod: S$PBB | Performed by: INTERNAL MEDICINE

## 2020-01-15 PROCEDURE — 99999 FLU VACCINE - HIGH DOSE (65+) PRESERVATIVE FREE IM: CPT | Mod: PBBFAC,,,

## 2020-01-15 PROCEDURE — 90714 TD VACC NO PRESV 7 YRS+ IM: CPT | Mod: PBBFAC

## 2020-01-15 PROCEDURE — 99999 PR PBB SHADOW E&M-EST. PATIENT-LVL III: ICD-10-PCS | Mod: PBBFAC,,, | Performed by: INTERNAL MEDICINE

## 2020-01-15 PROCEDURE — 99999 TD VACCINE GREATER THAN OR EQUAL TO 7YO PRESERVATIVE FREE IM: CPT | Mod: PBBFAC,,,

## 2020-01-15 PROCEDURE — 99999 PR STA SHADOW: CPT | Mod: PBBFAC,,, | Performed by: INTERNAL MEDICINE

## 2020-01-15 PROCEDURE — 90662 IIV NO PRSV INCREASED AG IM: CPT | Mod: PBBFAC

## 2020-01-15 RX ORDER — PANTOPRAZOLE SODIUM 40 MG/1
TABLET, DELAYED RELEASE ORAL
COMMUNITY
Start: 2019-12-04 | End: 2020-01-15

## 2020-01-15 RX ORDER — FLUTICASONE FUROATE, UMECLIDINIUM BROMIDE AND VILANTEROL TRIFENATATE 100; 62.5; 25 UG/1; UG/1; UG/1
POWDER RESPIRATORY (INHALATION)
COMMUNITY
Start: 2019-12-04 | End: 2020-08-14 | Stop reason: SDUPTHER

## 2020-01-15 RX ORDER — FERROUS SULFATE 324(65)MG
324 TABLET, DELAYED RELEASE (ENTERIC COATED) ORAL
COMMUNITY
Start: 2019-12-27 | End: 2020-05-04 | Stop reason: SDUPTHER

## 2020-01-15 RX ORDER — PANTOPRAZOLE SODIUM 40 MG/1
40 TABLET, DELAYED RELEASE ORAL DAILY
COMMUNITY
End: 2021-01-26

## 2020-01-15 RX ORDER — ALBUTEROL SULFATE 90 UG/1
2 AEROSOL, METERED RESPIRATORY (INHALATION) EVERY 6 HOURS PRN
COMMUNITY
End: 2020-02-07 | Stop reason: SDUPTHER

## 2020-01-15 RX ORDER — ASPIRIN 81 MG/1
81 TABLET ORAL DAILY
COMMUNITY
End: 2020-02-07 | Stop reason: SDUPTHER

## 2020-01-15 RX ORDER — IPRATROPIUM BROMIDE AND ALBUTEROL SULFATE 2.5; .5 MG/3ML; MG/3ML
3 SOLUTION RESPIRATORY (INHALATION) EVERY 6 HOURS PRN
COMMUNITY
End: 2020-02-07 | Stop reason: SDUPTHER

## 2020-01-15 RX ORDER — ALPRAZOLAM 0.5 MG/1
0.5 TABLET ORAL 2 TIMES DAILY PRN
Status: ON HOLD | COMMUNITY
End: 2020-01-16 | Stop reason: SDUPTHER

## 2020-01-15 RX ORDER — ACETAMINOPHEN 500 MG
500 TABLET ORAL EVERY 6 HOURS PRN
COMMUNITY

## 2020-01-15 NOTE — PROGRESS NOTES
"Subjective:       Patient ID: Kemar Perez is a 85 y.o. male.    Chief Complaint: Establish Care      HPI:  Patient is new to clinic and presents to establish care. He has COPD, CAD, HTN, HLD, h/o CABG, paroxysmal a-fib iron deficiency anemia and h/o GI bleed due to gastric ulcers/ angiodysplasia.      CAD: follows with Dr. Ballesteros. H/o 3x CABG. On ASA 81, statin, imdur, metoprolol. He denies angina sx but exertion very limited due to COPD.     Diastolic CHF: States he was started on lasix for "fluid on my lungs". Dr. Ballesteros's last clinic visit mentions diastolic CHF and moderate aortic stenosis; I don't have an updated TTE today. + SOB but denies LE edema and orthopna    HTN: on lisinopril, metoprolol. BP is very well controlled today. Doesn't check regularly at home.     HLD: on crestor. Labs done with [facility]; unsure what his last LDL is today. Denies medication side effects.     iron def anemia: follows with heme/onc as well. On PO iron and recently prescribed iron infusions. Has had multiple transfusions over last 1 year. Recurrent bleeding thought to be due to andiodysplasia of stomach/duodenum found on recent EGD.    COPD: follows with Dr. Lopez. On oxygen continuously at 3L. Uses trelegy inhaler and PRN albuterol INH and PRN duonebs. He reports chronic SOB. He is doing nebs 3x a day. He gets panic attacks when he feels SOB; Xanax is not effective.     Anxiety: on trazodone for sleep. Xanax not very helpful for panic attacks. Only gets a panic attack when he feels he can't breath    RLS: on requip. He sometimes forgets to take this but when he remembers he thinks it works well.       Past Medical History:   Diagnosis Date    Adenomatous polyps     Anemia     Aortic stenosis     Arthritis     AS (aortic stenosis)     Asthma     BBB (bundle branch block)     Cataract     COPD (chronic obstructive pulmonary disease)     Coronary artery disease     Encounter for blood transfusion     GERD (gastroesophageal " reflux disease)     GI bleed     H/O food poisoning     H/O: GI bleed     Heart block AV second degree     Hemorrhoids     History of shingles     History of stomach ulcers     HLD (hyperlipidemia)     Hx of migraines     Hx of migraines     Hypertension     Macular degeneration of right eye     PAF (paroxysmal atrial fibrillation)     Pneumonia        Family History   Problem Relation Age of Onset    Heart disease Paternal Grandmother     Hypertension Paternal Grandmother     Heart disease Paternal Grandfather     Hypertension Paternal Grandfather     Heart disease Father     Hypertension Father        Social History     Socioeconomic History    Marital status: Single     Spouse name: Not on file    Number of children: Not on file    Years of education: Not on file    Highest education level: Not on file   Occupational History    Not on file   Social Needs    Financial resource strain: Not on file    Food insecurity:     Worry: Not on file     Inability: Not on file    Transportation needs:     Medical: Not on file     Non-medical: Not on file   Tobacco Use    Smoking status: Former Smoker     Packs/day: 1.00     Types: Cigarettes     Start date:      Last attempt to quit:      Years since quittin.0    Smokeless tobacco: Never Used   Substance and Sexual Activity    Alcohol use: No    Drug use: No    Sexual activity: Not Currently   Lifestyle    Physical activity:     Days per week: Not on file     Minutes per session: Not on file    Stress: Not on file   Relationships    Social connections:     Talks on phone: Not on file     Gets together: Not on file     Attends [employer] service: Not on file     Active member of club or organization: Not on file     Attends meetings of clubs or organizations: Not on file     Relationship status: Not on file   Other Topics Concern    Not on file   Social History Narrative    Not on file       Review of Systems   Constitutional:  Negative for activity change, fatigue, fever and unexpected weight change.   HENT: Negative for congestion, ear pain, hearing loss, rhinorrhea and sore throat.    Eyes: Negative for redness and visual disturbance.   Respiratory: Positive for shortness of breath. Negative for cough and wheezing.    Cardiovascular: Negative for chest pain, palpitations and leg swelling.   Gastrointestinal: Negative for abdominal pain, constipation, diarrhea, nausea and vomiting.   Genitourinary: Negative for decreased urine volume, dysuria, frequency and urgency.   Musculoskeletal: Negative for back pain, joint swelling and neck pain.   Skin: Negative for color change, rash and wound.   Neurological: Negative for dizziness, tremors, weakness, light-headedness and headaches.   Psychiatric/Behavioral: The patient is nervous/anxious.          Objective:      Physical Exam   Constitutional: He is oriented to person, place, and time. He appears well-developed and well-nourished. No distress.   HENT:   Head: Normocephalic and atraumatic.   Right Ear: External ear normal.   Left Ear: External ear normal.   Eyes: Pupils are equal, round, and reactive to light. Conjunctivae and EOM are normal. Right eye exhibits no discharge. Left eye exhibits no discharge. No scleral icterus.   Neck: Neck supple. No tracheal deviation present.   Cardiovascular: Normal rate and regular rhythm. Exam reveals no gallop and no friction rub.   Murmur (systolic ejection murmur) heard.  Pulmonary/Chest: Effort normal. No respiratory distress. He has wheezes. He has no rales.   Very poor air flow b/l   Abdominal: Soft. Bowel sounds are normal. He exhibits no distension. There is no tenderness.   Musculoskeletal: He exhibits no edema.   Neurological: He is alert and oriented to person, place, and time. No cranial nerve deficit.   Skin: Skin is warm and dry.   Psychiatric: He has a normal mood and affect. His behavior is normal.   Vitals reviewed.      Assessment:        1. Coronary artery disease involving native coronary artery of native heart without angina pectoris    2. Nonrheumatic aortic valve stenosis    3. Chronic diastolic CHF (congestive heart failure)    4. Essential hypertension    5. Hyperlipidemia, unspecified hyperlipidemia type    6. Chronic obstructive pulmonary disease, unspecified COPD type    7. Chronic respiratory failure with hypoxia    8. Iron deficiency anemia due to chronic blood loss    9. 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 RLS (restless legs syndrome)    11. Anxiety    12. Need for vaccination        Plan:       Kemar was seen today for establish care.    Diagnoses and all orders for this visit:    Coronary artery disease involving native coronary artery of native heart without angina pectoris  -     Ambulatory referral to Home Health  Chronic stable  + SOB but likely more from severe COPD and chronic anemia  Very limited on exertion due to SOB  Follows with Dr. Ballesteros  Cont meds same dose  Readdress need for ASA if bleeding issues continue    Nonrheumatic aortic valve stenosis  -     Ambulatory referral to Home Health  Chronic stable  Cont meds same dose  Follows with Dr. Ballesteros    Chronic diastolic CHF (congestive heart failure)  -     Ambulatory referral to Home Health  Chronic stable  No signs of volume overload on exam today  Cont meds same dose  Follows with Dr. Ballesteros    Essential hypertension  -     Ambulatory referral to Home Health  Chronic controlled  Continue medications at same dose  Low Na diet  Exercise, weight loss  Check BP and keep log for next visit    Hyperlipidemia, unspecified hyperlipidemia type  -     Ambulatory referral to Home Health  Chronic controlled  Cont statin same dose    Chronic obstructive pulmonary disease, unspecified COPD type  -     Ambulatory referral to Home Health  Chronic  Sx reported as stable but severe  Very poor air movement b/l  Cont jordan newton  Sees Dr. Lopez  Difficult for me to  assess on first visit but seems severe, possibly end stage. I don't have recent PFTs  Briefly brought up hospice. 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 Monitor closely    Need for vaccination  -     (In Office Administered) Td Vaccine - Preservative Free  -     Influenza - High Dose (65+) (PF) (IM)      RTC 6 months and PRN

## 2020-01-16 ENCOUNTER — TELEPHONE (OUTPATIENT)
Dept: INTERNAL MEDICINE | Facility: CLINIC | Age: 85
End: 2020-01-16

## 2020-01-16 DIAGNOSIS — F41.9 ANXIETY: ICD-10-CM

## 2020-01-16 PROBLEM — D64.9 ANEMIA: Status: ACTIVE | Noted: 2020-01-16

## 2020-01-16 PROCEDURE — G0180 PR HOME HEALTH MD CERTIFICATION: ICD-10-PCS | Mod: ,,, | Performed by: INTERNAL MEDICINE

## 2020-01-16 PROCEDURE — G0180 MD CERTIFICATION HHA PATIENT: HCPCS | Mod: ,,, | Performed by: INTERNAL MEDICINE

## 2020-01-16 NOTE — TELEPHONE ENCOUNTER
Home health would like to know the correct medication that the patient should be taking. He is currently taking bot zocor and crestor. Which one should pt be taking?

## 2020-01-16 NOTE — TELEPHONE ENCOUNTER
----- Message from Emily De Jesus sent at 2020  3:15 PM CST -----  Contact: Kaitlin/[facility]  Kemar Perez  MRN: [id number]  : 1934  PCP: Jade Matute  Home Phone      [phone]  Work Phone      Not on file.  Mobile          [phone]    MESSAGE:     Patient was admitted to Home Health Services yesterday and she would like to go over medications to make sure that she has correct information. Please call to advise.    Phone: [phone]

## 2020-01-17 RX ORDER — ALPRAZOLAM 0.5 MG/1
0.5 TABLET ORAL 2 TIMES DAILY PRN
Qty: 60 TABLET | Refills: 0 | Status: SHIPPED | OUTPATIENT
Start: 2020-01-17 | End: 2020-02-07 | Stop reason: SDUPTHER

## 2020-01-17 RX ORDER — ROPINIROLE 1 MG/1
TABLET, FILM COATED ORAL
Qty: 30 TABLET | Refills: 5 | Status: SHIPPED | OUTPATIENT
Start: 2020-01-17 | End: 2020-02-07 | Stop reason: SDUPTHER

## 2020-01-17 NOTE — TELEPHONE ENCOUNTER
I only saw him once. As of our visit 1/15/20 he reported taking the crestor. That was also what was on his medication list from Dr. Ballesteros. They may also want to confirm with Dr. Ballesteros's office since this is who has been managing his medications prior to establishing with me; I didn't make any changes at his visit.

## 2020-01-19 NOTE — PROGRESS NOTES
PATIENT: Kemar Perez  MRN: [id number]  DATE: 1/19/2020    Diagnosis:   1. Iron deficiency anemia due to chronic blood loss    2. 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 Chronic diastolic CHF (congestive heart failure)      Chief Complaint: iron deficiency anemia    Subjective:    History of Present Illness: Mr. Perez is a 85 y.o. male who presented in November 2019 for evaluation and management of iron deficiency anemia. He was referred by gastroenterologist Dr. Cohen.    - labs dating back to January 2018 reveal a moderate normocytic anemia. Iron studies reveal a decreased ferritin/iron/saturated iron.  - it appears that he likely has angioectasias in his small intestines per his most recent note by Dr. Cohen (10/9/19).  - since April 2019, he has received 5 units of blood due to recurrent, symptomatic anemia.    Interval history:  - he presents for a follow-up appointment for his iron deficiency anemia.  - he received ferric carboxymaltose on 11/13/19 and 11/20/19.  - he was admitted twice in the past two months for symptomatic anemia secondary to gastrointestinal bleeding.  - today, he reports fatigue, dyspnea upon exertion, generalized weakness. He denies chest pain, nausea, vomiting, diarrhea, constipation.    Past medical, surgical, family, and social histories have been reviewed and updated below.    Past Medical History:   Past Medical History:   Diagnosis Date    Adenomatous polyps     Anemia     Aortic stenosis     Arthritis     AS (aortic stenosis)     Asthma     BBB (bundle branch block)     Cataract     COPD (chronic obstructive pulmonary disease)     Coronary artery disease     Encounter for blood transfusion     GERD (gastroesophageal reflux disease)     GI bleed     H/O food poisoning     H/O: GI bleed     Heart block AV second degree     Hemorrhoids     History of shingles     History of stomach ulcers     HLD (hyperlipidemia)     Hx of migraines     Hx  of migraines     Hypertension     Macular degeneration of right eye     PAF (paroxysmal atrial fibrillation)     Pneumonia        Past Surgical History:   Past Surgical History:   Procedure Laterality Date    CARDIAC ANGIOGRAM WITH STENTS      CARDIAC PACEMAKER PLACEMENT      CATARACT EXTRACTION      CATARACT SX Bilateral     COLONOSCOPY N/A 4/9/2019    Procedure: COLONOSCOPY;  Surgeon: Sebastien Worthy MD;  Location: [facility] ENDO;  Service: Endoscopy;  Laterality: N/A;    CORONARY ARTERY BYPASS GRAFT      CTR Right     ESOPHAGOGASTRODUODENOSCOPY N/A 4/8/2019    Procedure: EGD (ESOPHAGOGASTRODUODENOSCOPY);  Surgeon: Sebastien Worthy MD;  Location: [facility] ENDO;  Service: Endoscopy;  Laterality: N/A;    ESOPHAGOGASTRODUODENOSCOPY N/A 10/17/2019    Procedure: EGD (ESOPHAGOGASTRODUODENOSCOPY);  Surgeon: Fabrice Helm MD;  Location: [facility] ENDO;  Service: Endoscopy;  Laterality: N/A;  NPO, bed, 2uprbc since last night    JOINT REPLACEMENT      TOTAL KNEE ARTHROPLASTY Right     WRIST JOINT REMOVAL Right        Family History:   Family History   Problem Relation Age of Onset    Heart disease Paternal Grandmother     Hypertension Paternal Grandmother     Heart disease Paternal Grandfather     Hypertension Paternal Grandfather     Heart disease Father     Hypertension Father        Social History:  reports that he quit smoking about 37 years ago. His smoking use included cigarettes. He started smoking about 68 years ago. He smoked 1.00 pack per day. He has never used smokeless tobacco. He reports that he does not drink alcohol or use drugs.    Allergies:  Review of patient's allergies indicates:   Allergen Reactions    Dexamethasone Other (See Comments)     High blood pressure    Mobic [meloxicam] Other (See Comments)     Bleeds easily    Nsaids (non-steroidal anti-inflammatory drug) Other (See Comments)     Bleeding     Spiriva respimat [tiotropium bromide] Other (See Comments)     Dry  mouth       Medications:  Current Outpatient Medications   Medication Sig Dispense Refill    acetaminophen (TYLENOL) 500 MG tablet Take 500 mg by mouth every 6 (six) hours as needed for Pain.      albuterol (PROVENTIL/VENTOLIN HFA) 90 mcg/actuation inhaler Inhale 2 puffs into the lungs every 6 (six) hours as needed for Wheezing. Rescue      albuterol-ipratropium (DUO-NEB) 2.5 mg-0.5 mg/3 mL nebulizer solution Take 3 mLs by nebulization every 6 (six) hours as needed for Wheezing. Rescue      ALPRAZolam (XANAX) 0.5 MG tablet Take 1 tablet (0.5 mg total) by mouth 2 (two) times daily as needed for Anxiety. 60 tablet 0    aspirin (ECOTRIN) 81 MG EC tablet Take 81 mg by mouth once daily.      cyanocobalamin, vitamin B-12, 1,000 mcg/mL Kit INJECT ONE ML INTO THE MUSCLES EVERY MONTH 1 kit 11    ferrous sulfate 324 mg (65 mg iron) TbEC Take 324 mg by mouth.      furosemide (LASIX) 40 MG tablet Take 40 mg by mouth once daily.      isosorbide mononitrate (IMDUR) 60 MG 24 hr tablet Take 60 mg by mouth once daily.       lisinopril (PRINIVIL,ZESTRIL) 5 MG tablet Take 1 tablet (5 mg total) by mouth once daily. 90 tablet 3    metoprolol succinate (TOPROL-XL) 25 MG 24 hr tablet Take 25 mg by mouth once daily.  11    pantoprazole (PROTONIX) 40 MG tablet Take 40 mg by mouth once daily.      potassium chloride SA (K-DUR,KLOR-CON) 20 MEQ tablet Take 20 mEq by mouth 2 (two) times daily.  11    rOPINIRole (REQUIP) 1 MG tablet ropinirole 1 mg tablet QHS 30 tablet 5    rosuvastatin (CRESTOR) 20 MG tablet 20 mg.   11    traZODone (DESYREL) 100 MG tablet 200 mg.   3    TRELEGY ELLIPTA 100-62.5-25 mcg DsDv        No current facility-administered medications for this visit.        Review of Systems   Constitutional: Positive for fatigue.   HENT: Negative for sore throat.    Eyes: Negative for visual disturbance.   Respiratory: Positive for shortness of breath.    Cardiovascular: Negative for chest pain.   Gastrointestinal:  Positive for blood in stool. Negative for abdominal pain.   Genitourinary: Negative for dysuria.   Musculoskeletal: Negative for back pain.   Skin: Negative for rash.   Neurological: Positive for weakness. Negative for headaches.   Hematological: Negative for adenopathy.   Psychiatric/Behavioral: The patient is not nervous/anxious.        ECOG Performance Status:   ECOG SCORE 1     Objective:      Vitals:   Vitals:    01/20/20 1332   BP: 112/70   Pulse: 82   Resp: 18   Temp: 97.3 °F (36.3 °C)   TempSrc: Oral   SpO2: 99%   Weight: 77.1 kg (170 lb)     BMI: Body mass index is 23.71 kg/m².    Physical Exam   Constitutional: He is oriented to person, place, and time. He appears well-developed and well-nourished.   Fatigued, in wheelchair   HENT:   Head: Normocephalic and atraumatic.   On supplemental oxygen via nasal cannula   Eyes: Pupils are equal, round, and reactive to light. EOM are normal.   Neck: Normal range of motion. Neck supple.   Cardiovascular: Normal rate and regular rhythm.   Pulmonary/Chest: Effort normal and breath sounds normal.   Abdominal: Soft. Bowel sounds are normal.   Musculoskeletal: Normal range of motion. He exhibits no edema.   Neurological: He is alert and oriented to person, place, and time.   Skin: Skin is warm and dry.   Psychiatric: He has a normal mood and affect. His behavior is normal. Judgment and thought content normal.   Nursing note and vitals reviewed.    Laboratory Data:  Labs have been reviewed.    Lab Results   Component Value Date    WBC 8.30 01/18/2020    HGB 9.1 (L) 01/18/2020    HCT 29.3 (L) 01/18/2020    MCV 83 01/18/2020     01/18/2020         Imaging:     Upper GI endoscopy (10/17/19):  - Small hiatal hernia.  - Normal stomach.  - Normal examined duodenum.  - No specimens collected.    Colonoscopy (4/9/19):  - Diverticulosis in the sigmoid colon.  - The examination was otherwise normal.  - No specimens collected.    CT abdomen/pelvis (10/16/19):  The visualized  portion of the base of the lungs is significant for mild bilateral basilar atelectatic versus fibrotic change.  The visualized portion of the heart is significant for calcification of the right coronary artery.  The stomach and spleen are unremarkable.  There is fatty atrophy of the pancreas.  The liver, gallbladder, and adrenal glands are unremarkable.  The visualized portion of the aorta is significant for extensive scattered plaque which extends into its branches.  The left kidney contains a subcentimeter hypodensity too small to characterize.  The right kidney is unremarkable.  The bladder is unremarkable.  The bowel is unremarkable.  The appendix has a normal appearance.  The osseous structures demonstrate degenerative change    Assessment:       1. Iron deficiency anemia due to chronic blood loss    2. 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 Chronic diastolic CHF (congestive heart failure)         Plan:     1. Iron deficiency anemia due to chronic blood loss  - I have reviewed his labs  - labs dating back to January 2018 reveal a moderate normocytic anemia. Iron studies reveal a decreased ferritin/iron/saturated iron. These findings are consistent with iron deficiency anemia.  - given the severity of his anemia and iron deficiency, I recommended ferric carboxymaltose x 2 doses.  - he received ferric carboxymaltose on 11/13/19 and 11/20/19.  - unfortunately, he appears to be having recurrent gastrointestinal bleeding.  - I asked that he talk to his cardiologist about discontinuation of aspirin. It sounds like he will continue to have gastrointestinal bleeding, and aspirin is likely worsening this bleeding.  - return to clinic in two months with repeat iron studies.    2. Angiodysplasia of duodenum  - Dr. Cohen feels his iron deficiency is secondary to angiodysplasias in his small intestines  - unfortunately, he appears to be having recurrent gastrointestinal bleeding.  - defer further  management to gastroenterology    3. Advance Care Planning     Power of   I initiated the process of advance care planning today and explained the importance of this process to the patient.  I introduced the concept of advance directives to the patient, as well. Then the patient received detailed information about the importance of designating a Health Care Power of  (HCPOA). He was also instructed to communicate with this person about their wishes for future healthcare, should he become sick and lose decision-making capacity. The patient has not previously appointed a HCPOA. After our discussion, the patient has decided to complete a HCPOA and has appointed his friend John Wei ([phone]).           - return to clinic in two months with repeat iron studies.    Arnold Trinidad M.D.  Hematology/Oncology  [facility] - [address], [address]  [place] [address]  Phone: [phone]  Fax: [phone]

## 2020-01-20 ENCOUNTER — OFFICE VISIT (OUTPATIENT)
Dept: HEMATOLOGY/ONCOLOGY | Facility: CLINIC | Age: 85
End: 2020-01-20
Payer: MEDICARE

## 2020-01-20 VITALS
DIASTOLIC BLOOD PRESSURE: 70 MMHG | BODY MASS INDEX: 23.71 KG/M2 | HEART RATE: 82 BPM | WEIGHT: 170 LBS | RESPIRATION RATE: 18 BRPM | TEMPERATURE: 97 F | OXYGEN SATURATION: 99 % | SYSTOLIC BLOOD PRESSURE: 112 MMHG

## 2020-01-20 DIAGNOSIS — K31.811 DUODENAL HEMORRHAGE DUE TO ANGIODYSPLASIA OF DUODENUM: ICD-10-CM

## 2020-01-20 DIAGNOSIS — D50.0 IRON DEFICIENCY ANEMIA DUE TO CHRONIC BLOOD LOSS: Primary | ICD-10-CM

## 2020-01-20 DIAGNOSIS — I50.32 CHRONIC DIASTOLIC CHF (CONGESTIVE HEART FAILURE): ICD-10-CM

## 2020-01-20 PROCEDURE — 1126F AMNT PAIN NOTED NONE PRSNT: CPT | Mod: ,,, | Performed by: INTERNAL MEDICINE

## 2020-01-20 PROCEDURE — 99999 PR PBB SHADOW E&M-EST. PATIENT-LVL III: CPT | Mod: PBBFAC,,, | Performed by: INTERNAL MEDICINE

## 2020-01-20 PROCEDURE — 99999 PR PBB SHADOW E&M-EST. PATIENT-LVL III: ICD-10-PCS | Mod: PBBFAC,,, | Performed by: INTERNAL MEDICINE

## 2020-01-20 PROCEDURE — 1159F PR MEDICATION LIST DOCUMENTED IN MEDICAL RECORD: ICD-10-PCS | Mod: ,,, | Performed by: INTERNAL MEDICINE

## 2020-01-20 PROCEDURE — 99214 PR OFFICE/OUTPT VISIT, EST, LEVL IV, 30-39 MIN: ICD-10-PCS | Mod: S$PBB,,, | Performed by: INTERNAL MEDICINE

## 2020-01-20 PROCEDURE — 99213 OFFICE O/P EST LOW 20 MIN: CPT | Mod: PBBFAC,PO | Performed by: INTERNAL MEDICINE

## 2020-01-20 PROCEDURE — 1126F PR PAIN SEVERITY QUANTIFIED, NO PAIN PRESENT: ICD-10-PCS | Mod: ,,, | Performed by: INTERNAL MEDICINE

## 2020-01-20 PROCEDURE — 99214 OFFICE O/P EST MOD 30 MIN: CPT | Mod: S$PBB,,, | Performed by: INTERNAL MEDICINE

## 2020-01-20 PROCEDURE — 1159F MED LIST DOCD IN RCRD: CPT | Mod: ,,, | Performed by: INTERNAL MEDICINE

## 2020-01-21 ENCOUNTER — EXTERNAL HOME HEALTH (OUTPATIENT)
Dept: HOME HEALTH SERVICES | Facility: HOSPITAL | Age: 85
End: 2020-01-21
Payer: MEDICARE

## 2020-01-21 ENCOUNTER — TELEPHONE (OUTPATIENT)
Dept: HEMATOLOGY/ONCOLOGY | Facility: CLINIC | Age: 85
End: 2020-01-21

## 2020-01-21 DIAGNOSIS — D50.0 IRON DEFICIENCY ANEMIA DUE TO CHRONIC BLOOD LOSS: Primary | ICD-10-CM

## 2020-01-21 RX ORDER — SODIUM CHLORIDE 0.9 % (FLUSH) 0.9 %
10 SYRINGE (ML) INJECTION
Status: CANCELLED | OUTPATIENT
Start: 2020-01-28

## 2020-01-21 RX ORDER — HEPARIN 100 UNIT/ML
500 SYRINGE INTRAVENOUS
Status: CANCELLED | OUTPATIENT
Start: 2020-02-04

## 2020-01-21 RX ORDER — SODIUM CHLORIDE 0.9 % (FLUSH) 0.9 %
10 SYRINGE (ML) INJECTION
Status: CANCELLED | OUTPATIENT
Start: 2020-02-04

## 2020-01-21 RX ORDER — HEPARIN 100 UNIT/ML
500 SYRINGE INTRAVENOUS
Status: CANCELLED | OUTPATIENT
Start: 2020-01-28

## 2020-01-21 NOTE — TELEPHONE ENCOUNTER
I called and left a voicemail. Since he has had recurrent gastrointestinal bleeding, his iron studies show continued iron deficiency. I recommend another two doses of ferric carboxymaltose.    We will get the injectafer scheduled. Return to clinic in 2 months with repeat labs.    Arnold Trinidad M.D.  Hematology/Oncology  [facility] - [address], [address]  [place] [address]  Phone: [phone]  Fax: [phone]

## 2020-02-06 ENCOUNTER — TELEPHONE (OUTPATIENT)
Dept: INTERNAL MEDICINE | Facility: CLINIC | Age: 85
End: 2020-02-06

## 2020-02-06 NOTE — TELEPHONE ENCOUNTER
----- Message from Emily De Jesus sent at 2020  2:19 PM CST -----  Contact: Maria Victoria/[facility]  Kemar Perez  MRN: [id number]  : 1934  PCP: Jade Matute  Home Phone      [phone]  Work Phone      Not on file.  Mobile          [phone]    MESSAGE:     Requesting a copy of patient's updated medication list.  Please call or fax to number below.    Phone: [phone]  Fax [phone]

## 2020-02-07 DIAGNOSIS — J44.9 CHRONIC OBSTRUCTIVE PULMONARY DISEASE, UNSPECIFIED COPD TYPE: ICD-10-CM

## 2020-02-07 DIAGNOSIS — I25.10 CORONARY ARTERY DISEASE INVOLVING NATIVE CORONARY ARTERY OF NATIVE HEART WITHOUT ANGINA PECTORIS: ICD-10-CM

## 2020-02-07 DIAGNOSIS — F41.9 ANXIETY: ICD-10-CM

## 2020-02-07 NOTE — TELEPHONE ENCOUNTER
----- Message from Galina Elizabeth sent at 2020  8:51 AM CST -----  Contact: [facility]  Kemar Perez  MRN: [id number]  : 1934  PCP: Jade Matute  Home Phone      [phone]  Work Phone      Not on file.  Mobile          [phone]      MESSAGE:   Pt requesting refill or new Rx.   Is this a refill or new RX:  refill  RX name and strength:   1. albuterol (PROVENTIL/VENTOLIN HFA) 90 mcg/actuation inhaler     2.albuterol-ipratropium (DUO-NEB) 2.5 mg-0.5 mg/3 mL nebulizer solution    3.aspirin (ECOTRIN) 81 MG EC tablet    4.ALPRAZolam (XANAX) 0.5 MG tablet    5.rOPINIRole (REQUIP) 1 MG tablet    Last office visit: 01/15/2020  Is this a 30-day or 90-day RX:  30  Pharmacy name and location:  [facility]  Comments:

## 2020-02-10 RX ORDER — ROPINIROLE 1 MG/1
TABLET, FILM COATED ORAL
Qty: 30 TABLET | Refills: 5 | Status: SHIPPED | OUTPATIENT
Start: 2020-02-10

## 2020-02-10 RX ORDER — ALPRAZOLAM 0.5 MG/1
0.5 TABLET ORAL 2 TIMES DAILY PRN
Qty: 60 TABLET | Refills: 0 | Status: SHIPPED | OUTPATIENT
Start: 2020-02-10 | End: 2020-07-13 | Stop reason: SDUPTHER

## 2020-02-10 RX ORDER — IPRATROPIUM BROMIDE AND ALBUTEROL SULFATE 2.5; .5 MG/3ML; MG/3ML
3 SOLUTION RESPIRATORY (INHALATION) EVERY 6 HOURS PRN
Qty: 1 BOX | Refills: 3 | Status: SHIPPED | OUTPATIENT
Start: 2020-02-10 | End: 2020-06-15

## 2020-02-10 RX ORDER — ASPIRIN 81 MG/1
81 TABLET ORAL DAILY
Qty: 90 TABLET | Refills: 3 | Status: ON HOLD | OUTPATIENT
Start: 2020-02-10 | End: 2021-06-06

## 2020-02-10 RX ORDER — ALBUTEROL SULFATE 90 UG/1
2 AEROSOL, METERED RESPIRATORY (INHALATION) EVERY 6 HOURS PRN
Qty: 18 G | Refills: 3 | Status: SHIPPED | OUTPATIENT
Start: 2020-02-10 | End: 2020-07-07

## 2020-02-12 NOTE — TELEPHONE ENCOUNTER
Script was sent to [facility] and they need the script to go to [facility]. Order pended with the correct pharmacy.

## 2020-02-12 NOTE — TELEPHONE ENCOUNTER
----- Message from Arabella Pruitt sent at 2020  9:50 AM CST -----  Contact: Kaitlin with [facility]   Kemar Perez  MRN: [id number]  : 1934  PCP: Jade Matute  Home Phone      [phone]  Work Phone      Not on file.  Mobile          [phone]    MESSAGE:   Rx refill - cyanocobalamin, vitamin B-12, 1,000 mcg/mL Kit    Phone # [phone]    Pharmacy - [facility] [place]  [place] - [address] Replaced by [facility]

## 2020-03-05 ENCOUNTER — OFFICE VISIT (OUTPATIENT)
Dept: INTERNAL MEDICINE | Facility: CLINIC | Age: 85
End: 2020-03-05
Payer: MEDICARE

## 2020-03-05 VITALS
WEIGHT: 160.94 LBS | HEIGHT: 71 IN | SYSTOLIC BLOOD PRESSURE: 110 MMHG | OXYGEN SATURATION: 95 % | RESPIRATION RATE: 16 BRPM | HEART RATE: 60 BPM | BODY MASS INDEX: 22.53 KG/M2 | DIASTOLIC BLOOD PRESSURE: 58 MMHG

## 2020-03-05 DIAGNOSIS — R63.4 WEIGHT LOSS: ICD-10-CM

## 2020-03-05 DIAGNOSIS — N62 GYNECOMASTIA: Primary | ICD-10-CM

## 2020-03-05 PROCEDURE — 99999 PR STA SHADOW: CPT | Mod: PBBFAC,,, | Performed by: INTERNAL MEDICINE

## 2020-03-05 PROCEDURE — 99214 OFFICE O/P EST MOD 30 MIN: CPT | Mod: PBBFAC | Performed by: INTERNAL MEDICINE

## 2020-03-05 PROCEDURE — 99213 OFFICE O/P EST LOW 20 MIN: CPT | Mod: S$PBB | Performed by: INTERNAL MEDICINE

## 2020-03-05 PROCEDURE — 99999 PR PBB SHADOW E&M-EST. PATIENT-LVL IV: ICD-10-PCS | Mod: PBBFAC,,, | Performed by: INTERNAL MEDICINE

## 2020-03-05 PROCEDURE — 99999 PR PBB SHADOW E&M-EST. PATIENT-LVL IV: CPT | Mod: PBBFAC,,, | Performed by: INTERNAL MEDICINE

## 2020-03-05 RX ORDER — HYDROCHLOROTHIAZIDE 25 MG/1
TABLET ORAL
COMMUNITY
Start: 2020-02-11 | End: 2020-03-05

## 2020-03-05 NOTE — PROGRESS NOTES
Subjective:       Patient ID: Kemar Perez is a 85 y.o. male.    Chief Complaint: Weight Loss; Breast Pain; and Chest Pain    Kemar Perez is a 86 y/o male  With PMH of COPD, CAD , chronic blood loss anemia   who presents to the clinic with chest tenderness. Patient reports weight loss of about 15 lbs in the last 6 months despite a normal appetite. He also is complaining of tenderness to his bilateral breasts around the nipples. The pain is worse with palpation.     Review of Systems   Constitutional: Positive for unexpected weight change. Negative for appetite change, chills and fever.   HENT: Negative for congestion, postnasal drip and sore throat.    Eyes: Negative for photophobia.   Respiratory: Negative for chest tightness and shortness of breath.    Cardiovascular: Negative for chest pain.   Gastrointestinal: Negative for abdominal distention, abdominal pain, blood in stool and vomiting.   Endocrine:        Bilateral breast growth and tenderness worse on the left.   Genitourinary: Negative for dysuria, flank pain and hematuria.   Musculoskeletal: Negative for back pain.   Skin: Negative for pallor.   Neurological: Negative for dizziness, seizures, facial asymmetry, speech difficulty and numbness.   Hematological: Does not bruise/bleed easily.   Psychiatric/Behavioral: Negative for agitation and suicidal ideas. The patient is not nervous/anxious.        Objective:      Physical Exam   Constitutional: He is oriented to person, place, and time. He appears well-developed and well-nourished. No distress.   HENT:   Head: Normocephalic and atraumatic.   Right Ear: External ear normal.   Left Ear: External ear normal.   Eyes: Pupils are equal, round, and reactive to light. Conjunctivae and EOM are normal. Right eye exhibits no discharge. Left eye exhibits no discharge. No scleral icterus.   Neck: Neck supple. No tracheal deviation present.   Cardiovascular: Normal rate and regular rhythm. Exam reveals no gallop  and no friction rub.   Murmur (systolic ejection murmur) heard.  Pulmonary/Chest: Effort normal. No respiratory distress. He has wheezes. He has no rales. Right breast exhibits tenderness. Left breast exhibits tenderness. Breasts are asymmetrical.   Very poor air flow b/l  Gynecomastia bilaterally with tenderness to palpation worse on left.        Abdominal: Soft. Bowel sounds are normal. He exhibits no distension. There is no tenderness.   Musculoskeletal: He exhibits no edema.   Neurological: He is alert and oriented to person, place, and time. No cranial nerve deficit.   Skin: Skin is warm and dry.   Psychiatric: He has a normal mood and affect. His behavior is normal.   Vitals reviewed.      Assessment:       1. Gynecomastia    2. Weight loss        Plan:   Kemar was seen today for weight loss, breast pain and chest pain.    Diagnoses and all orders for this visit:    Gynecomastia  -     CBC auto differential; Future  -     Comprehensive metabolic panel; Future  -     Urinalysis; Future  -     TSH; Future  -     Mammo Digital Diagnostic Bilat; Future    Weight loss  -     CBC auto differential; Future  -     Comprehensive metabolic panel; Future  -     Urinalysis; Future  -     TSH; Future  -     Mammo Digital Diagnostic Bilat; Future  -Instructed to drink cans of boost everyday      Problem List Items Addressed This Visit     None      Visit Diagnoses     Gynecomastia    -  Primary    Relevant Orders    CBC auto differential    Comprehensive metabolic panel    Urinalysis    TSH    Mammo Digital Diagnostic Bilat    Weight loss        Relevant Orders    CBC auto differential    Comprehensive metabolic panel    Urinalysis    TSH    Mammo Digital Diagnostic Bilat

## 2020-03-13 ENCOUNTER — HOSPITAL ENCOUNTER (OUTPATIENT)
Dept: RADIOLOGY | Facility: HOSPITAL | Age: 85
Discharge: HOME | End: 2020-03-13
Attending: INTERNAL MEDICINE
Payer: MEDICARE

## 2020-03-13 VITALS — WEIGHT: 160 LBS | BODY MASS INDEX: 22.4 KG/M2 | HEIGHT: 71 IN

## 2020-03-13 DIAGNOSIS — N62 GYNECOMASTIA: ICD-10-CM

## 2020-03-13 DIAGNOSIS — R63.4 WEIGHT LOSS: ICD-10-CM

## 2020-03-13 PROCEDURE — 77066 DX MAMMO INCL CAD BI: CPT | Mod: 26,,, | Performed by: RADIOLOGY

## 2020-03-13 PROCEDURE — 77066 MAMMO DIGITAL DIAGNOSTIC BILAT WITH CAD: ICD-10-PCS | Mod: 26,,, | Performed by: RADIOLOGY

## 2020-03-13 PROCEDURE — 77066 DX MAMMO INCL CAD BI: CPT | Mod: TC

## 2020-03-19 ENCOUNTER — TELEPHONE (OUTPATIENT)
Dept: HEMATOLOGY/ONCOLOGY | Facility: CLINIC | Age: 85
End: 2020-03-19

## 2020-03-19 ENCOUNTER — OFFICE VISIT (OUTPATIENT)
Dept: INTERNAL MEDICINE | Facility: CLINIC | Age: 85
End: 2020-03-19
Payer: MEDICARE

## 2020-03-19 VITALS
HEART RATE: 68 BPM | RESPIRATION RATE: 16 BRPM | HEIGHT: 71 IN | SYSTOLIC BLOOD PRESSURE: 138 MMHG | WEIGHT: 155 LBS | TEMPERATURE: 98 F | BODY MASS INDEX: 21.7 KG/M2 | DIASTOLIC BLOOD PRESSURE: 70 MMHG

## 2020-03-19 DIAGNOSIS — N62 GYNECOMASTIA: ICD-10-CM

## 2020-03-19 DIAGNOSIS — D64.9 ANEMIA, UNSPECIFIED TYPE: Primary | ICD-10-CM

## 2020-03-19 DIAGNOSIS — D64.9 SYMPTOMATIC ANEMIA: ICD-10-CM

## 2020-03-19 PROCEDURE — 99999 PR PBB SHADOW E&M-EST. PATIENT-LVL IV: CPT | Mod: PBBFAC,,, | Performed by: INTERNAL MEDICINE

## 2020-03-19 PROCEDURE — 99999 PR STA SHADOW: CPT | Mod: PBBFAC,,, | Performed by: INTERNAL MEDICINE

## 2020-03-19 PROCEDURE — 99214 OFFICE O/P EST MOD 30 MIN: CPT | Mod: PBBFAC | Performed by: INTERNAL MEDICINE

## 2020-03-19 PROCEDURE — 99213 OFFICE O/P EST LOW 20 MIN: CPT | Mod: S$PBB | Performed by: INTERNAL MEDICINE

## 2020-03-19 PROCEDURE — 99999 PR STA SHADOW: ICD-10-PCS | Mod: PBBFAC,,, | Performed by: INTERNAL MEDICINE

## 2020-03-19 RX ORDER — BICALUTAMIDE 50 MG/1
50 TABLET, FILM COATED ORAL NIGHTLY
COMMUNITY
Start: 2020-03-06 | End: 2020-05-04

## 2020-03-19 RX ORDER — FERROUS FUMARATE 324(106)MG
1 TABLET ORAL EVERY MORNING
COMMUNITY
Start: 2020-03-06 | End: 2020-05-15 | Stop reason: SDUPTHER

## 2020-03-19 RX ORDER — CYANOCOBALAMIN 1000 UG/ML
1000 INJECTION, SOLUTION INTRAMUSCULAR; SUBCUTANEOUS
Qty: 10 ML | Refills: 3 | Status: SHIPPED | OUTPATIENT
Start: 2020-03-19

## 2020-03-19 RX ORDER — HYDROCHLOROTHIAZIDE 25 MG/1
25 TABLET ORAL DAILY
COMMUNITY
Start: 2020-03-06 | End: 2021-03-31

## 2020-03-19 NOTE — PROGRESS NOTES
Subjective:       Patient ID: Kemar Perez is a 85 y.o. male.    Chief Complaint: Follow-up    Kemar Perez is a 85 y.o. male  Here with follow up for pain in breasts ; mammogram negative   Simple gynecomastia   He was on B12 ; he stopped his shots .  His last b12 is normal     Review of Systems   Constitutional: Positive for unexpected weight change. Negative for appetite change, chills and fever.   HENT: Negative for congestion, postnasal drip and sore throat.    Eyes: Negative for photophobia.   Respiratory: Negative for chest tightness and shortness of breath.    Cardiovascular: Negative for chest pain.   Gastrointestinal: Negative for abdominal distention, abdominal pain, blood in stool and vomiting.   Endocrine:        Bilateral breast growth and tenderness worse on the left.   Genitourinary: Negative for dysuria, flank pain and hematuria.   Musculoskeletal: Negative for back pain.   Skin: Negative for pallor.   Neurological: Negative for dizziness, seizures, facial asymmetry, speech difficulty and numbness.   Hematological: Does not bruise/bleed easily.   Psychiatric/Behavioral: Negative for agitation and suicidal ideas. The patient is not nervous/anxious.        Objective:      Physical Exam   Constitutional: He is oriented to person, place, and time. He appears well-developed and well-nourished. No distress.   HENT:   Head: Normocephalic and atraumatic.   Right Ear: External ear normal.   Left Ear: External ear normal.   Eyes: Pupils are equal, round, and reactive to light. Conjunctivae and EOM are normal. Right eye exhibits no discharge. Left eye exhibits no discharge. No scleral icterus.   Neck: Neck supple. No tracheal deviation present.   Cardiovascular: Normal rate and regular rhythm. Exam reveals no gallop and no friction rub.   Murmur (systolic ejection murmur) heard.  Pulmonary/Chest: Effort normal. No respiratory distress. He has wheezes. He has no rales. Right breast exhibits tenderness.  Left breast exhibits tenderness. Breasts are asymmetrical.   Very poor air flow b/l  Gynecomastia bilaterally with tenderness to palpation worse on left.        Abdominal: Soft. Bowel sounds are normal. He exhibits no distension. There is no tenderness.   Musculoskeletal: He exhibits no edema.   Neurological: He is alert and oriented to person, place, and time. No cranial nerve deficit.   Skin: Skin is warm and dry.   Psychiatric: He has a normal mood and affect. His behavior is normal.   Vitals reviewed.      Assessment:       1. Anemia, unspecified type    2. Symptomatic anemia    3. Gynecomastia        Plan:   Kemar was seen today for follow-up.    Diagnoses and all orders for this visit:    Anemia, unspecified type  -     cyanocobalamin 1,000 mcg/mL injection; Inject 1 mL (1,000 mcg total) into the muscle every 28 days.  Encouraged to take shots   He reports he will take them     Symptomatic anemia  -     cyanocobalamin 1,000 mcg/mL injection; Inject 1 mL (1,000 mcg total) into the muscle every 28 days.    Gynecomastia  Mammogram reviewed.      Lab Results   Component Value Date    WBC 6.67 03/13/2020    HGB 10.7 (L) 03/13/2020    HCT 35.0 (L) 03/13/2020    MCV 89 03/13/2020     03/13/2020       CMP  Sodium   Date Value Ref Range Status   03/13/2020 137 136 - 145 mmol/L Final     Potassium   Date Value Ref Range Status   03/13/2020 4.3 3.5 - 5.1 mmol/L Final     Chloride   Date Value Ref Range Status   03/13/2020 99 95 - 110 mmol/L Final     CO2   Date Value Ref Range Status   03/13/2020 29 23 - 29 mmol/L Final     Glucose   Date Value Ref Range Status   03/13/2020 101 70 - 110 mg/dL Final   10/30/2016 114 (H) 74 - 106 MG/DL Final     BUN, Bld   Date Value Ref Range Status   03/13/2020 20 8 - 23 mg/dL Final     Creatinine   Date Value Ref Range Status   03/13/2020 1.1 0.5 - 1.4 mg/dL Final     Calcium   Date Value Ref Range Status   03/13/2020 9.4 8.7 - 10.5 mg/dL Final     Total Protein   Date Value Ref  Range Status   03/13/2020 7.2 6.0 - 8.4 g/dL Final     Albumin   Date Value Ref Range Status   03/13/2020 3.9 3.5 - 5.2 g/dL Final   10/30/2016 4.3 3.5 - 5.0 G/DL Final     Total Bilirubin   Date Value Ref Range Status   03/13/2020 0.3 0.1 - 1.0 mg/dL Final     Comment:     For infants and newborns, interpretation of results should be based  on gestational age, weight and in agreement with clinical  observations.  Premature Infant recommended reference ranges:  Up to 24 hours.............<8.0 mg/dL  Up to 48 hours............<12.0 mg/dL  3-5 days..................<15.0 mg/dL  6-29 days.................<15.0 mg/dL       Alkaline Phosphatase   Date Value Ref Range Status   03/13/2020 85 55 - 135 U/L Final     AST   Date Value Ref Range Status   03/13/2020 15 10 - 40 U/L Final     ALT   Date Value Ref Range Status   03/13/2020 10 10 - 44 U/L Final     Anion Gap   Date Value Ref Range Status   03/13/2020 9 8 - 16 mmol/L Final     eGFR if    Date Value Ref Range Status   03/13/2020 >60 >60 mL/min/1.73 m^2 Final     eGFR if non    Date Value Ref Range Status   03/13/2020 >60 >60 mL/min/1.73 m^2 Final     Comment:     Calculation used to obtain the estimated glomerular filtration  rate (eGFR) is the CKD-EPI equation.        Lab Results   Component Value Date    TSH 0.486 03/13/2020          Problem List Items Addressed This Visit     Symptomatic anemia    Anemia - Primary    Gynecomastia

## 2020-03-19 NOTE — TELEPHONE ENCOUNTER
Pt. Spoke to Dr. Trinidad regarding lab results.  Appt. Rescheduled on 5/18/20 at 11am.    11:15am:  Pt. Confirmed lab appt. At 10am and office visit with Dr. Trinidad at 11am on 5/18/20.

## 2020-03-30 ENCOUNTER — HOSPITAL ENCOUNTER (OUTPATIENT)
Dept: RADIOLOGY | Facility: HOSPITAL | Age: 85
Discharge: HOME | End: 2020-03-30
Attending: INTERNAL MEDICINE
Payer: MEDICARE

## 2020-03-30 DIAGNOSIS — J44.89 OBSTRUCTIVE CHRONIC BRONCHITIS WITHOUT EXACERBATION: Primary | ICD-10-CM

## 2020-03-30 DIAGNOSIS — J44.89 OBSTRUCTIVE CHRONIC BRONCHITIS WITHOUT EXACERBATION: ICD-10-CM

## 2020-03-30 PROCEDURE — 71046 XR CHEST PA AND LATERAL: ICD-10-PCS | Mod: 26,,, | Performed by: RADIOLOGY

## 2020-03-30 PROCEDURE — 71046 X-RAY EXAM CHEST 2 VIEWS: CPT | Mod: 26,,, | Performed by: RADIOLOGY

## 2020-03-30 PROCEDURE — 71046 X-RAY EXAM CHEST 2 VIEWS: CPT | Mod: TC

## 2020-04-14 ENCOUNTER — HOSPITAL ENCOUNTER (EMERGENCY)
Facility: HOSPITAL | Age: 85
Discharge: HOME | End: 2020-04-14
Attending: SURGERY
Payer: MEDICARE

## 2020-04-14 VITALS
SYSTOLIC BLOOD PRESSURE: 144 MMHG | HEART RATE: 60 BPM | RESPIRATION RATE: 20 BRPM | TEMPERATURE: 98 F | BODY MASS INDEX: 22.32 KG/M2 | WEIGHT: 160 LBS | DIASTOLIC BLOOD PRESSURE: 68 MMHG | OXYGEN SATURATION: 99 %

## 2020-04-14 DIAGNOSIS — R06.02 SHORTNESS OF BREATH: Primary | ICD-10-CM

## 2020-04-14 LAB
ALBUMIN SERPL BCP-MCNC: 3.7 G/DL (ref 3.5–5.2)
ALP SERPL-CCNC: 74 U/L (ref 55–135)
ALT SERPL W/O P-5'-P-CCNC: 10 U/L (ref 10–44)
ANION GAP SERPL CALC-SCNC: 8 MMOL/L (ref 8–16)
AST SERPL-CCNC: 13 U/L (ref 10–40)
BASOPHILS # BLD AUTO: 0.02 K/UL (ref 0–0.2)
BASOPHILS NFR BLD: 0.2 % (ref 0–1.9)
BILIRUB SERPL-MCNC: 0.3 MG/DL (ref 0.1–1)
BNP SERPL-MCNC: 284 PG/ML (ref 0–99)
BUN SERPL-MCNC: 21 MG/DL (ref 8–23)
CALCIUM SERPL-MCNC: 9.1 MG/DL (ref 8.7–10.5)
CHLORIDE SERPL-SCNC: 101 MMOL/L (ref 95–110)
CK MB SERPL-MCNC: 2.5 NG/ML (ref 0.1–6.5)
CK MB SERPL-MCNC: 2.6 NG/ML (ref 0.1–6.5)
CK MB SERPL-RTO: 3.7 % (ref 0–5)
CK MB SERPL-RTO: 4 % (ref 0–5)
CK SERPL-CCNC: 62 U/L (ref 20–200)
CK SERPL-CCNC: 62 U/L (ref 20–200)
CK SERPL-CCNC: 71 U/L (ref 20–200)
CK SERPL-CCNC: 71 U/L (ref 20–200)
CO2 SERPL-SCNC: 33 MMOL/L (ref 23–29)
CREAT SERPL-MCNC: 0.9 MG/DL (ref 0.5–1.4)
D DIMER PPP IA.FEU-MCNC: 0.8 MG/L FEU
DIFFERENTIAL METHOD: ABNORMAL
EOSINOPHIL # BLD AUTO: 0.2 K/UL (ref 0–0.5)
EOSINOPHIL NFR BLD: 2.3 % (ref 0–8)
ERYTHROCYTE [DISTWIDTH] IN BLOOD BY AUTOMATED COUNT: 13.5 % (ref 11.5–14.5)
EST. GFR  (AFRICAN AMERICAN): >60 ML/MIN/1.73 M^2
EST. GFR  (NON AFRICAN AMERICAN): >60 ML/MIN/1.73 M^2
GLUCOSE SERPL-MCNC: 122 MG/DL (ref 70–110)
HCT VFR BLD AUTO: 33.2 % (ref 40–54)
HCT VFR BLD AUTO: 34.4 % (ref 40–54)
HGB BLD-MCNC: 10.2 G/DL (ref 14–18)
HGB BLD-MCNC: 10.6 G/DL (ref 14–18)
IMM GRANULOCYTES # BLD AUTO: 0.03 K/UL (ref 0–0.04)
IMM GRANULOCYTES NFR BLD AUTO: 0.3 % (ref 0–0.5)
LYMPHOCYTES # BLD AUTO: 1 K/UL (ref 1–4.8)
LYMPHOCYTES NFR BLD: 11 % (ref 18–48)
MCH RBC QN AUTO: 27.3 PG (ref 27–31)
MCHC RBC AUTO-ENTMCNC: 30.8 G/DL (ref 32–36)
MCV RBC AUTO: 89 FL (ref 82–98)
MONOCYTES # BLD AUTO: 0.5 K/UL (ref 0.3–1)
MONOCYTES NFR BLD: 6.2 % (ref 4–15)
NEUTROPHILS # BLD AUTO: 6.9 K/UL (ref 1.8–7.7)
NEUTROPHILS NFR BLD: 80 % (ref 38–73)
NRBC BLD-RTO: 0 /100 WBC
PLATELET # BLD AUTO: 220 K/UL (ref 150–350)
PMV BLD AUTO: 11.5 FL (ref 9.2–12.9)
POTASSIUM SERPL-SCNC: 3.4 MMOL/L (ref 3.5–5.1)
PROT SERPL-MCNC: 6.5 G/DL (ref 6–8.4)
RBC # BLD AUTO: 3.88 M/UL (ref 4.6–6.2)
SARS-COV-2 RDRP RESP QL NAA+PROBE: NEGATIVE
SODIUM SERPL-SCNC: 142 MMOL/L (ref 136–145)
TROPONIN I SERPL DL<=0.01 NG/ML-MCNC: 0.03 NG/ML (ref 0–0.03)
TROPONIN I SERPL DL<=0.01 NG/ML-MCNC: 0.03 NG/ML (ref 0–0.03)
WBC # BLD AUTO: 8.6 K/UL (ref 3.9–12.7)

## 2020-04-14 PROCEDURE — 85014 HEMATOCRIT: CPT | Mod: 91

## 2020-04-14 PROCEDURE — 63600175 PHARM REV CODE 636 W HCPCS: Performed by: NURSE PRACTITIONER

## 2020-04-14 PROCEDURE — U0002 COVID-19 LAB TEST NON-CDC: HCPCS

## 2020-04-14 PROCEDURE — 82553 CREATINE MB FRACTION: CPT | Mod: 91

## 2020-04-14 PROCEDURE — 25000242 PHARM REV CODE 250 ALT 637 W/ HCPCS: Performed by: NURSE PRACTITIONER

## 2020-04-14 PROCEDURE — 99284 EMERGENCY DEPT VISIT MOD MDM: CPT | Mod: 25

## 2020-04-14 PROCEDURE — 83880 ASSAY OF NATRIURETIC PEPTIDE: CPT

## 2020-04-14 PROCEDURE — 96372 THER/PROPH/DIAG INJ SC/IM: CPT

## 2020-04-14 PROCEDURE — 85379 FIBRIN DEGRADATION QUANT: CPT

## 2020-04-14 PROCEDURE — 82550 ASSAY OF CK (CPK): CPT | Mod: 91

## 2020-04-14 PROCEDURE — 36415 COLL VENOUS BLD VENIPUNCTURE: CPT

## 2020-04-14 PROCEDURE — 93010 ELECTROCARDIOGRAM REPORT: CPT | Mod: ,,, | Performed by: INTERNAL MEDICINE

## 2020-04-14 PROCEDURE — 80053 COMPREHEN METABOLIC PANEL: CPT

## 2020-04-14 PROCEDURE — 84484 ASSAY OF TROPONIN QUANT: CPT | Mod: 91

## 2020-04-14 PROCEDURE — 85018 HEMOGLOBIN: CPT | Mod: 91

## 2020-04-14 PROCEDURE — 93005 ELECTROCARDIOGRAM TRACING: CPT

## 2020-04-14 PROCEDURE — 85025 COMPLETE CBC W/AUTO DIFF WBC: CPT

## 2020-04-14 PROCEDURE — 93010 EKG 12-LEAD: ICD-10-PCS | Mod: ,,, | Performed by: INTERNAL MEDICINE

## 2020-04-14 RX ORDER — METHYLPREDNISOLONE SOD SUCC 125 MG
80 VIAL (EA) INJECTION
Status: DISCONTINUED | OUTPATIENT
Start: 2020-04-14 | End: 2020-04-14

## 2020-04-14 RX ORDER — METHYLPREDNISOLONE SOD SUCC 125 MG
125 VIAL (EA) INJECTION
Status: COMPLETED | OUTPATIENT
Start: 2020-04-14 | End: 2020-04-14

## 2020-04-14 RX ORDER — AZITHROMYCIN 250 MG/1
250 TABLET, FILM COATED ORAL DAILY
Qty: 6 TABLET | Refills: 0 | Status: SHIPPED | OUTPATIENT
Start: 2020-04-14 | End: 2020-05-04

## 2020-04-14 RX ORDER — ALBUTEROL SULFATE 2.5 MG/.5ML
2.5 SOLUTION RESPIRATORY (INHALATION)
Status: COMPLETED | OUTPATIENT
Start: 2020-04-14 | End: 2020-04-14

## 2020-04-14 RX ADMIN — ALBUTEROL SULFATE 2.5 MG: 2.5 SOLUTION RESPIRATORY (INHALATION) at 11:04

## 2020-04-14 RX ADMIN — METHYLPREDNISOLONE SODIUM SUCCINATE 125 MG: 125 INJECTION, POWDER, FOR SOLUTION INTRAMUSCULAR; INTRAVENOUS at 04:04

## 2020-04-14 NOTE — DISCHARGE INSTRUCTIONS
**Follow up with pulmonology in 24-48 hours. Return to ER with worsening of symptoms. Continue wearing oxygen. Take breathing treatments as needed.     **Our goal in the emergency department is to always give you outstanding care and exceptional service. You may receive a survey by mail or e-mail in the next week regarding your experience in our ED. We would greatly appreciate your completing and returning the survey. Your feedback provides us with a way to recognize our staff who give very good care and it helps us learn how to improve when your experience was below our aspiration of excellence.

## 2020-04-14 NOTE — ED PROVIDER NOTES
Encounter Date: 4/14/2020       History     Chief Complaint   Patient presents with    Shortness of Breath     The history is provided by the patient.   Shortness of Breath   This is a chronic problem. The problem occurs continuously.The current episode started 3 to 5 hours ago (when he woke up today). The problem has been gradually improving (since this AM). Pertinent negatives include no fever, no headaches, no rhinorrhea, no sore throat, no ear pain, no neck pain, no cough, no sputum production, no hemoptysis, no wheezing, no chest pain, no vomiting, no abdominal pain, no rash, no leg pain and no leg swelling. He has tried nothing for the symptoms. Associated medical issues include COPD.   Patient on home oxygen 3 liters with O2 sat >98%.  Ambulating without difficulty. No respiratory distress noted.     Review of patient's allergies indicates:   Allergen Reactions    Dexamethasone Other (See Comments)     High blood pressure    Mobic [meloxicam] Other (See Comments)     Bleeds easily    Nsaids (non-steroidal anti-inflammatory drug) Other (See Comments)     Bleeding     Spiriva respimat [tiotropium bromide] Other (See Comments)     Dry mouth     Past Medical History:   Diagnosis Date    Adenomatous polyps     Anemia     Aortic stenosis     Arthritis     AS (aortic stenosis)     Asthma     BBB (bundle branch block)     Cataract     COPD (chronic obstructive pulmonary disease)     Coronary artery disease     Encounter for blood transfusion     GERD (gastroesophageal reflux disease)     GI bleed     H/O food poisoning     H/O: GI bleed     Heart block AV second degree     Hemorrhoids     History of shingles     History of stomach ulcers     HLD (hyperlipidemia)     Hx of migraines     Hx of migraines     Hypertension     Macular degeneration of right eye     PAF (paroxysmal atrial fibrillation)     Pneumonia      Past Surgical History:   Procedure Laterality Date    CARDIAC ANGIOGRAM  WITH STENTS      CARDIAC PACEMAKER PLACEMENT      CATARACT EXTRACTION      CATARACT SX Bilateral     COLONOSCOPY N/A 2019    Procedure: COLONOSCOPY;  Surgeon: Sebastien Worthy MD;  Location: [facility];  Service: Endoscopy;  Laterality: N/A;    CORONARY ARTERY BYPASS GRAFT      CTR Right     ESOPHAGOGASTRODUODENOSCOPY N/A 2019    Procedure: EGD (ESOPHAGOGASTRODUODENOSCOPY);  Surgeon: Sebastien Worthy MD;  Location: [facility];  Service: Endoscopy;  Laterality: N/A;    ESOPHAGOGASTRODUODENOSCOPY N/A 10/17/2019    Procedure: EGD (ESOPHAGOGASTRODUODENOSCOPY);  Surgeon: Fabrice Helm MD;  Location[facility];  Service: Endoscopy;  Laterality: N/A;  NPO, bed, 2uprbc since last night    JOINT REPLACEMENT      TOTAL KNEE ARTHROPLASTY Right     WRIST JOINT REMOVAL Right      Family History   Problem Relation Age of Onset    Heart disease Paternal Grandmother     Hypertension Paternal Grandmother     Heart disease Paternal Grandfather     Hypertension Paternal Grandfather     Heart disease Father     Hypertension Father      Social History     Tobacco Use    Smoking status: Former Smoker     Packs/day: 1.00     Types: Cigarettes     Start date:      Last attempt to quit:      Years since quittin.3    Smokeless tobacco: Never Used   Substance Use Topics    Alcohol use: No    Drug use: No     Review of Systems   Constitutional: Negative for fever.   HENT: Negative for congestion, ear pain, rhinorrhea, sore throat and trouble swallowing.    Eyes: Negative for pain and redness.   Respiratory: Positive for shortness of breath. Negative for cough, hemoptysis, sputum production and wheezing.    Cardiovascular: Negative for chest pain and leg swelling.   Gastrointestinal: Positive for blood in stool (chronic dark stools with a known slow GI bleed). Negative for abdominal pain and vomiting.   Genitourinary: Negative for difficulty urinating and dysuria.   Musculoskeletal:  Negative for arthralgias, back pain, myalgias and neck pain.   Skin: Negative for rash and wound.   Neurological: Negative for seizures, weakness and headaches.   Psychiatric/Behavioral: Negative.        Physical Exam     Initial Vitals   BP Pulse Resp Temp SpO2   04/14/20 1033 04/14/20 1030 04/14/20 1034 04/14/20 1033 04/14/20 1030   138/62 68 20 97.7 °F (36.5 °C) 98 %      MAP       --                Physical Exam    Nursing note and vitals reviewed.  Constitutional: No distress.   HENT:   Head: Normocephalic and atraumatic.   Right Ear: External ear normal.   Left Ear: External ear normal.   Nose: Nose normal.   Mouth/Throat: Oropharynx is clear and moist and mucous membranes are normal.   Eyes: Conjunctivae, EOM and lids are normal. Pupils are equal, round, and reactive to light.   Neck: Neck supple.   Cardiovascular: Normal rate, regular rhythm, normal heart sounds and intact distal pulses.   Pulmonary/Chest: No respiratory distress. He has decreased breath sounds.   Abdominal: Soft. Bowel sounds are normal. There is no tenderness.   Musculoskeletal: Normal range of motion.   Neurological: He is alert and oriented to person, place, and time. He has normal strength.   Skin: Skin is warm and dry. Capillary refill takes less than 2 seconds. No rash noted.   Psychiatric: He has a normal mood and affect. His behavior is normal.         ED Course   Procedures  Labs Reviewed   CBC W/ AUTO DIFFERENTIAL - Abnormal; Notable for the following components:       Result Value    RBC 3.88 (*)     Hemoglobin 10.6 (*)     Hematocrit 34.4 (*)     Mean Corpuscular Hemoglobin Conc 30.8 (*)     Gran% 80.0 (*)     Lymph% 11.0 (*)     All other components within normal limits   COMPREHENSIVE METABOLIC PANEL - Abnormal; Notable for the following components:    Potassium 3.4 (*)     CO2 33 (*)     Glucose 122 (*)     All other components within normal limits   TROPONIN I - Abnormal; Notable for the following components:    Troponin I  0.030 (*)     All other components within normal limits   B-TYPE NATRIURETIC PEPTIDE - Abnormal; Notable for the following components:     (*)     All other components within normal limits   D DIMER, QUANTITATIVE - Abnormal; Notable for the following components:    D-Dimer 0.80 (*)     All other components within normal limits   TROPONIN I - Abnormal; Notable for the following components:    Troponin I 0.033 (*)     All other components within normal limits   HEMATOCRIT - Abnormal; Notable for the following components:    Hematocrit 33.2 (*)     All other components within normal limits   HEMOGLOBIN - Abnormal; Notable for the following components:    Hemoglobin 10.2 (*)     All other components within normal limits   SARS-COV-2 RNA AMPLIFICATION, QUAL    Narrative:     What symptom criteria does the patient meet?->Difficulty  breathing   CK-MB   CK   CK-MB   CK     EKG Readings: (Independently Interpreted)   EKG read with MD at the time it was performed. EKG without concerning findings.      ECG Results          EKG 12-lead (Final result)  Result time 04/14/20 12:58:08    Final result by Interface, Lab In [facility] (04/14/20 12:58:08)                 Narrative:    Test Reason : R06.02,    Vent. Rate : 064 BPM     Atrial Rate : 063 BPM     P-R Int : 206 ms          QRS Dur : 170 ms      QT Int : 478 ms       P-R-T Axes : 091 100 -67 degrees     QTc Int : 493 ms    AV dual-paced rhythm  Abnormal ECG  When compared with ECG of 16-JAN-2020 16:01,  Vent. rate has increased BY   4 BPM  Confirmed by KARELY MASON MD ([id number]) on 4/14/2020 12:57:56 PM    Referred By: [email or web address]   SELF           Confirmed By:KARELY MASON MD                            Imaging Results          X-Ray Chest AP Portable (Final result)  Result time 04/14/20 11:23:33    Final result by Juan R Flores MD (04/14/20 11:23:33)                 Impression:      As above.      Electronically signed by: Juan R Flores  MD  Date:    04/14/2020  Time:    11:23             Narrative:    EXAMINATION:  XR CHEST AP PORTABLE    CLINICAL HISTORY:  Suspected Covid-19 Virus Infection;    TECHNIQUE:  Single frontal view of the chest was performed.    FINDINGS:  The lungs are well expanded and unremarkable.  There is no pneumothorax or pleural fluid.  The cardiac silhouette is not enlarged.  There is calcification of the aorta.  Patient is status post sternotomy and there is a left-sided cardiac defibrillator.  The osseous structures demonstrate degenerative change.                                                   ED Course as of Apr 14 1552   Tue Apr 14, 2020   1136 YEARS algorithm rules out PE. No clinical signs of DVT, no hemoptysis, TX not most likely diagnosis.     [EW]   1136 Plan of care discussed with collaborating provider. Agrees with plan of care today.       [EW]      ED Course User Index  [EW] Ryann Romeo NP           Medications   methylPREDNISolone sodium succinate injection 125 mg (has no administration in time range)   albuterol sulfate nebulizer solution 2.5 mg (2.5 mg Nebulization Given 4/14/20 1149)           Clinical Impression:       ICD-10-CM ICD-9-CM   1. Shortness of breath R06.02 786.05     The patient acknowledges that close follow up with medical provider is required. Instructed to follow up with pulmonology within 2 days. Patient was given specific return precautions. The patient agrees to comply with all instruction and directions given in the ER.         ED Disposition Condition    Discharge Stable        ED Prescriptions     Medication Sig Dispense Start Date End Date Auth. Provider    azithromycin (Z-CHRIST) 250 MG tablet Take 1 tablet (250 mg total) by mouth once daily. Take first 2 tablets together, then 1 every day until finished. 6 tablet 4/14/2020  Ryann Romeo NP        Follow-up Information     Follow up With Specialties Details Why Contact Info    Wyatt Lopez MD Pulmonary Disease, Internal  Medicine Schedule an appointment as soon as possible for a visit in 2 days  [address] [place] [id number]  [phone]                                       Ryann Romeo NP  04/14/20 1552       Raynn Romeo NP  04/14/20 1552

## 2020-04-20 ENCOUNTER — DOCUMENT SCAN (OUTPATIENT)
Dept: HOME HEALTH SERVICES | Facility: HOSPITAL | Age: 85
End: 2020-04-20
Payer: MEDICARE

## 2020-04-21 ENCOUNTER — DOCUMENT SCAN (OUTPATIENT)
Dept: HOME HEALTH SERVICES | Facility: HOSPITAL | Age: 85
End: 2020-04-21
Payer: MEDICARE

## 2020-04-29 ENCOUNTER — DOCUMENT SCAN (OUTPATIENT)
Dept: HOME HEALTH SERVICES | Facility: HOSPITAL | Age: 85
End: 2020-04-29
Payer: MEDICARE

## 2020-05-04 ENCOUNTER — OFFICE VISIT (OUTPATIENT)
Dept: INTERNAL MEDICINE | Facility: CLINIC | Age: 85
End: 2020-05-04
Payer: MEDICARE

## 2020-05-04 ENCOUNTER — TELEPHONE (OUTPATIENT)
Dept: INTERNAL MEDICINE | Facility: CLINIC | Age: 85
End: 2020-05-04

## 2020-05-04 VITALS
OXYGEN SATURATION: 95 % | BODY MASS INDEX: 20.8 KG/M2 | HEIGHT: 71 IN | HEART RATE: 60 BPM | RESPIRATION RATE: 18 BRPM | DIASTOLIC BLOOD PRESSURE: 68 MMHG | TEMPERATURE: 97 F | SYSTOLIC BLOOD PRESSURE: 124 MMHG | WEIGHT: 148.56 LBS

## 2020-05-04 DIAGNOSIS — G25.81 RLS (RESTLESS LEGS SYNDROME): ICD-10-CM

## 2020-05-04 DIAGNOSIS — I10 ESSENTIAL HYPERTENSION: Primary | ICD-10-CM

## 2020-05-04 DIAGNOSIS — D50.0 IRON DEFICIENCY ANEMIA DUE TO CHRONIC BLOOD LOSS: ICD-10-CM

## 2020-05-04 DIAGNOSIS — J44.9 CHRONIC OBSTRUCTIVE PULMONARY DISEASE, UNSPECIFIED COPD TYPE: ICD-10-CM

## 2020-05-04 DIAGNOSIS — R63.4 UNINTENTIONAL WEIGHT LOSS: ICD-10-CM

## 2020-05-04 DIAGNOSIS — J96.11 CHRONIC RESPIRATORY FAILURE WITH HYPOXIA: ICD-10-CM

## 2020-05-04 DIAGNOSIS — F41.9 ANXIETY: ICD-10-CM

## 2020-05-04 DIAGNOSIS — Z11.4 ENCOUNTER FOR SCREENING FOR HUMAN IMMUNODEFICIENCY VIRUS (HIV): ICD-10-CM

## 2020-05-04 DIAGNOSIS — Z72.89 OTHER PROBLEMS RELATED TO LIFESTYLE: ICD-10-CM

## 2020-05-04 DIAGNOSIS — I25.10 CORONARY ARTERY DISEASE INVOLVING NATIVE CORONARY ARTERY OF NATIVE HEART WITHOUT ANGINA PECTORIS: ICD-10-CM

## 2020-05-04 DIAGNOSIS — I44.1 SECOND DEGREE AV BLOCK: ICD-10-CM

## 2020-05-04 DIAGNOSIS — E78.5 HYPERLIPIDEMIA, UNSPECIFIED HYPERLIPIDEMIA TYPE: ICD-10-CM

## 2020-05-04 DIAGNOSIS — I50.32 CHRONIC DIASTOLIC CHF (CONGESTIVE HEART FAILURE): ICD-10-CM

## 2020-05-04 DIAGNOSIS — I35.0 NONRHEUMATIC AORTIC VALVE STENOSIS: ICD-10-CM

## 2020-05-04 PROBLEM — K92.1 MELENA: Status: RESOLVED | Noted: 2018-01-20 | Resolved: 2020-05-04

## 2020-05-04 PROCEDURE — 99999 PR PBB SHADOW E&M-EST. PATIENT-LVL V: CPT | Mod: PBBFAC,,, | Performed by: INTERNAL MEDICINE

## 2020-05-04 PROCEDURE — 99215 OFFICE O/P EST HI 40 MIN: CPT | Mod: PBBFAC | Performed by: INTERNAL MEDICINE

## 2020-05-04 PROCEDURE — 99999 PR STA SHADOW: CPT | Mod: PBBFAC,,, | Performed by: INTERNAL MEDICINE

## 2020-05-04 PROCEDURE — 99214 OFFICE O/P EST MOD 30 MIN: CPT | Mod: S$PBB | Performed by: INTERNAL MEDICINE

## 2020-05-04 PROCEDURE — 99999 PR STA SHADOW: ICD-10-PCS | Mod: PBBFAC,,, | Performed by: INTERNAL MEDICINE

## 2020-05-04 NOTE — TELEPHONE ENCOUNTER
----- Message from Jade Matute MD sent at 5/4/2020  3:50 PM CDT -----  Please notify patient I added some labs to work up his weight loss. I reached out to his hematologist who said we could do his iron labs all at the same time so he doesn't have to do them again in a few weeks. He can do his labs anytime this week. Orders placed this afternoon.    Thanks!  Dr. Matute   ----- Message -----  From: Arnold Trinidad MD  Sent: 5/4/2020   2:15 PM CDT  To: Jade Matute MD    Thanks for the message. I agree with you about his weight loss being related to his chronic cardiopulmonary issues. If you could add a CBC, ferritin, and iron/TIBC to his labs, that would be great! If you schedule his labs before 5/18/20, I can cancel my labs that were scheduled for that day.    Thanks!  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 He is loosing weight, about 17# over 4 months. Before I do labs on him I just wanted to reach out and see if you recommended anything specific from a heme/onc standpoint so we can try to consolidate his testing. He had C-scope and EGD within last year. PSA 11/2019 3 (h/o prostate cancer). CXR 4/2020 without large mass (COPD, smoker). I suspect weight loss from chronic cardiopulmonary issues and can test for HIV, Hepatitis, etc but just wanted to be sure there was nothing from your perspective to look for as well.    Appreciate your time,  Dr. Matute

## 2020-05-04 NOTE — Clinical Note
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 He is loosing weight, about 17# over 4 months. Before I do labs on him I just wanted to reach out and see if you recommended anything specific from a heme/onc standpoint so we can try to consolidate his testing. He had C-scope and EGD within last year. PSA 11/2019 3 (h/o prostate cancer). CXR 4/2020 without large mass (COPD, smoker). I suspect weight loss from chronic cardiopulmonary issues and can test for HIV, Hepatitis, etc but just wanted to be sure there was nothing from your perspective to look for as well.Appreciate your time,Dr. Matute

## 2020-05-04 NOTE — PROGRESS NOTES
"Subjective:       Patient ID: Kemar Perez is a 85 y.o. male.    Chief Complaint: Follow-up (3 mo)      HPI:  Patient is known to me and presents for follow up COPD on home oxygen, CAD, HTN, HLD, h/o CABG, paroxysmal a-fib s/p PPM for 2nd degree AV block, iron deficiency anemia and h/o GI bleed due to gastric ulcers/ angiodysplasia.      today he reports continue chest wall/breast pain. Saw Dr. Aguirre while I was on maternity leave. MMG normal. He reports still has pain, sensitive to the touch.    Waking up SOB several times a night. Uses CPAP at night. Hasn't discussed with Dr. Lopez yet. Not sure his CPAP settings.     CAD: follows with Dr. Ballesteros. H/o 3x CABG. On ASA 81, statin, imdur, metoprolol. He denies angina sx but exertion very limited due to COPD.      Diastolic CHF: States he was started on lasix for "fluid on my lungs". Dr. Ballesteros's last clinic visit mentions diastolic CHF and moderate aortic stenosis; I don't have an updated TTE. + SOB but denies LE edema and orthopna     HTN: on lisinopril, metoprolol. BP is very well controlled today. Doesn't check regularly at home.      HLD: on crestor. Labs done with [facility]; unsure what his last LDL is today. Denies medication side effects.      iron def anemia: follows with heme/onc as well. On PO iron and recently prescribed iron infusions. Has had multiple transfusions over last 1 year. Recurrent bleeding thought to be due to andiodysplasia of stomach/duodenum found on recent EGD.     COPD: follows with Dr. Lopez. On oxygen continuously at 3L. Uses trelegy inhaler and PRN albuterol INH and PRN duonebs. He reports chronic SOB. He is doing nebs 3x a day. He gets panic attacks when he feels SOB; Xanax is not effective.      Anxiety: on trazodone for sleep. Xanax not very helpful for panic attacks. Only gets a panic attack when he feels he can't breath     RLS: on requip. He sometimes forgets to take this but when he remembers he thinks it works well.     History of " prostate cancer: PSA May 2018 13.7. Was treated by Dr. clinton and most recent PSA 3 (2019).    He continues to have weight loss. Down 17 pounds over last 4 months. He reports good appetite and eating normally. Drinking Boost 3x daily as well. Weight down to 148lb today.  C-scope 2019, EGD 10/2019, PSA 2019, CXR 2020 without mass.     Past Medical History:   Diagnosis Date    Adenomatous polyps     Anemia     Aortic stenosis     Arthritis     AS (aortic stenosis)     Asthma     BBB (bundle branch block)     Cataract     COPD (chronic obstructive pulmonary disease)     Coronary artery disease     Encounter for blood transfusion     GERD (gastroesophageal reflux disease)     GI bleed     H/O food poisoning     H/O: GI bleed     Heart block AV second degree     Hemorrhoids     History of shingles     History of stomach ulcers     HLD (hyperlipidemia)     Hx of migraines     Hx of migraines     Hypertension     Macular degeneration of right eye     PAF (paroxysmal atrial fibrillation)     Pneumonia        Family History   Problem Relation Age of Onset    Heart disease Paternal Grandmother     Hypertension Paternal Grandmother     Heart disease Paternal Grandfather     Hypertension Paternal Grandfather     Heart disease Father     Hypertension Father        Social History     Socioeconomic History    Marital status: Single     Spouse name: Not on file    Number of children: Not on file    Years of education: Not on file    Highest education level: Not on file   Occupational History    Not on file   Social Needs    Financial resource strain: Not on file    Food insecurity:     Worry: Not on file     Inability: Not on file    Transportation needs:     Medical: Not on file     Non-medical: Not on file   Tobacco Use    Smoking status: Former Smoker     Packs/day: 1.00     Types: Cigarettes     Start date:      Last attempt to quit:      Years since quittin.3     Smokeless tobacco: Never Used   Substance and Sexual Activity    Alcohol use: No    Drug use: No    Sexual activity: Not Currently   Lifestyle    Physical activity:     Days per week: Not on file     Minutes per session: Not on file    Stress: Not on file   Relationships    Social connections:     Talks on phone: Not on file     Gets together: Not on file     Attends Taoist service: Not on file     Active member of club or organization: Not on file     Attends meetings of clubs or organizations: Not on file     Relationship status: Not on file   Other Topics Concern    Not on file   Social History Narrative    Not on file       Review of Systems   Constitutional: Positive for fatigue and unexpected weight change. Negative for activity change and fever.   HENT: Negative for congestion, ear pain, hearing loss, rhinorrhea and sore throat.    Eyes: Negative for pain, redness and visual disturbance.   Respiratory: Positive for shortness of breath. Negative for cough and wheezing.    Cardiovascular: Negative for chest pain, palpitations and leg swelling.   Gastrointestinal: Negative for abdominal pain, constipation, diarrhea, nausea and vomiting.   Genitourinary: Negative for decreased urine volume, dysuria, frequency and urgency.   Musculoskeletal: Negative for back pain, joint swelling and neck pain.   Skin: Negative for color change, rash and wound.   Neurological: Negative for dizziness, tremors, weakness, light-headedness and headaches.   Psychiatric/Behavioral: Positive for sleep disturbance (waking up SOB).         Objective:      Physical Exam   Constitutional: He is oriented to person, place, and time. He appears well-developed. No distress.   Thin but not cachetic, can tell he has lost weight     HENT:   Head: Normocephalic and atraumatic.   Right Ear: External ear normal.   Left Ear: External ear normal.   Eyes: Pupils are equal, round, and reactive to light. Conjunctivae and EOM are normal.  Right eye exhibits no discharge. Left eye exhibits no discharge.   Neck: Neck supple. No tracheal deviation present.   Cardiovascular: Normal rate and regular rhythm. Exam reveals no gallop and no friction rub.   Murmur (3/6 systolic murmur) heard.  Pulmonary/Chest: Effort normal and breath sounds normal. No respiratory distress. He has no wheezes. He has no rales.   Abdominal: Soft. Bowel sounds are normal. He exhibits no distension. There is no tenderness.   Neurological: He is alert and oriented to person, place, and time. No cranial nerve deficit.   Skin: Skin is warm and dry.   Psychiatric: He has a normal mood and affect. His behavior is normal.   Vitals reviewed.      Assessment:       1. Essential hypertension    2. Hyperlipidemia, unspecified hyperlipidemia type    3. Nonrheumatic aortic valve stenosis    4. Chronic diastolic CHF (congestive heart failure)    5. Coronary artery disease involving native coronary artery of native heart without angina pectoris    6. Second degree AV block    7. Chronic obstructive pulmonary disease, unspecified COPD type    8. Chronic respiratory failure with hypoxia    9. Anxiety    10. RLS (restless legs syndrome)    11. Iron deficiency anemia due to chronic blood loss    12. Unintentional weight loss        Plan:       Kemar was seen today for follow-up.    Diagnoses and all orders for this visit:    Essential hypertension  Chronic stable  Continue medications at same dose  Low Na diet  Exercise, weight loss  Check BP and keep log for next visit    Hyperlipidemia, unspecified hyperlipidemia type  Chronic stable  Cont statin    Nonrheumatic aortic valve stenosis  Chronic stable  No signs of volume overload today  Follows with cardiology    Chronic diastolic CHF (congestive heart failure)  Chronic stable  No signs of volume overload today  Cont meds same dose  Follows with cardiology    Coronary artery disease involving native coronary artery of native heart without angina  pectoris  Chronic stable  Denies angina sx today  Cont statin  Follows with cardiology    Second degree AV block  Stable  S/p PPM  Follows with cardiology    Chronic obstructive pulmonary disease, unspecified COPD type  Chronic respiratory failure with hypoxia  On trelegy and PRN albuterol  On chronic home O2, continuously  Still DHALIWAL and waking up at night SOB  Follows with Dr. Lopez  Last PFTs severe obstruction    Anxiety  Chronic stable  Cont meds same dose    RLS (restless legs syndrome)  Chronic stable  Cont requip    Iron deficiency anemia due to chronic blood loss  Chronic stable  Doing Fe infusions with hematology  Due to chronic GI loss    Unintentional weight loss  ? Etiology  c-scope in last 1 year without mass  PSA 11/2019 3 (h/o prostate cancer)  CXR 4/2020 without mass  Could test for HIV, hepatitis, maybe inflammatory markers for autoimmune (though no other signs/sx to suggest). Add prealbumin  Will reach out to oncologist to see if anything more from their perspective  lina from long standing cardiopulmonary issues  Cont boost

## 2020-05-05 RX ORDER — TRAZODONE HYDROCHLORIDE 100 MG/1
200 TABLET ORAL NIGHTLY
Refills: 3 | OUTPATIENT
Start: 2020-05-05

## 2020-05-05 RX ORDER — FERROUS FUMARATE 324(106)MG
TABLET ORAL
Qty: 90 TABLET | Refills: 0 | OUTPATIENT
Start: 2020-05-05

## 2020-05-05 NOTE — TELEPHONE ENCOUNTER
----- Message from Emily De Jesus sent at 2020  4:03 PM CDT -----  Contact: [facility]  Kemar Perez  MRN: [id number]  : 1934  PCP: Jade Matute  Home Phone      [phone]  Work Phone      Not on file.  [employer]          [phone]      MESSAGE:   Refill  traZODone (DESYREL) 100 MG tablet  FERROCITE 324 mg (106 mg iron) Tab    Pharmacy: [facility] [place] - [address]    Phone: [phone]

## 2020-05-05 NOTE — TELEPHONE ENCOUNTER
Spoke with patient's great grandlibrado Serra and he stated that he will get intouch with his father Bertram and try to get patient to give us a call back.

## 2020-05-08 NOTE — TELEPHONE ENCOUNTER
Spoke to patient. Notified of lab orders per Dr. Matute. Instructed that he go to [facility] @ his convenience to have labs done. Patient verbalized understanding.

## 2020-05-11 RX ORDER — TRAZODONE HYDROCHLORIDE 100 MG/1
TABLET ORAL
Qty: 180 TABLET | Refills: 0 | OUTPATIENT
Start: 2020-05-11

## 2020-05-15 PROCEDURE — G0179 MD RECERTIFICATION HHA PT: HCPCS | Mod: ,,, | Performed by: INTERNAL MEDICINE

## 2020-05-15 PROCEDURE — G0179 PR HOME HEALTH MD RECERTIFICATION: ICD-10-PCS | Mod: ,,, | Performed by: INTERNAL MEDICINE

## 2020-05-15 RX ORDER — TRAZODONE HYDROCHLORIDE 100 MG/1
200 TABLET ORAL NIGHTLY
Qty: 30 TABLET | Refills: 0 | Status: SHIPPED | OUTPATIENT
Start: 2020-05-15 | End: 2020-06-01

## 2020-05-15 RX ORDER — FERROUS FUMARATE 324(106)MG
TABLET ORAL
Qty: 30 TABLET | Refills: 0 | Status: SHIPPED | OUTPATIENT
Start: 2020-05-15 | End: 2020-06-10

## 2020-05-21 ENCOUNTER — TELEPHONE (OUTPATIENT)
Dept: NEUROLOGY | Facility: HOSPITAL | Age: 85
End: 2020-05-21

## 2020-05-21 NOTE — TELEPHONE ENCOUNTER
----- Message from Sahra Tijerina MA sent at 5/13/2020  1:15 PM CDT -----  Good afternoon,    We received a referral from Zaina Gorman PA-C with [facility] for this patient to be seen by . The referral is for iron deficiency anemia and it looks like the patient was seen by  last year. I have scanned the referral and records into  for review. Please reach out to the patient to schedule.    Thank you   Sahra Prasad

## 2020-05-25 DIAGNOSIS — D50.0 IRON DEFICIENCY ANEMIA DUE TO CHRONIC BLOOD LOSS: Primary | ICD-10-CM

## 2020-05-27 ENCOUNTER — EXTERNAL HOME HEALTH (OUTPATIENT)
Dept: HOME HEALTH SERVICES | Facility: HOSPITAL | Age: 85
End: 2020-05-27
Payer: MEDICARE

## 2020-05-27 NOTE — PROGRESS NOTES
60 Day Recert Order # [id number]  New Cert Period: 05/15/2020 to 07/13/2020 with [facility] - Dr. Jade Matute

## 2020-06-02 ENCOUNTER — TELEPHONE (OUTPATIENT)
Dept: NEUROLOGY | Facility: HOSPITAL | Age: 85
End: 2020-06-02

## 2020-06-03 ENCOUNTER — OFFICE VISIT (OUTPATIENT)
Dept: NEUROLOGY | Facility: HOSPITAL | Age: 85
End: 2020-06-03
Attending: INTERNAL MEDICINE
Payer: MEDICARE

## 2020-06-03 DIAGNOSIS — D50.0 ANEMIA DUE TO CHRONIC BLOOD LOSS: Primary | ICD-10-CM

## 2020-06-03 PROCEDURE — 99211 OFF/OP EST MAY X REQ PHY/QHP: CPT | Performed by: INTERNAL MEDICINE

## 2020-06-18 NOTE — PROGRESS NOTES
[facility]    CC: anemia     HPI 85 y.o. male with a history of CAD and AS presents for additional evaluation of a three year history of CHRISTINE requiring PRBC transfusion every ~6 months. He denies associated gross rectal bleeding or melena. He has a history of B12 and iron deficiency in his personal history as well as family history.     Past Medical History  Past Medical History:   Diagnosis Date    Adenomatous polyps     Anemia     Aortic stenosis     Arthritis     AS (aortic stenosis)     Asthma     BBB (bundle branch block)     Cataract     COPD (chronic obstructive pulmonary disease)     Coronary artery disease     Encounter for blood transfusion     GERD (gastroesophageal reflux disease)     GI bleed     H/O food poisoning     H/O: GI bleed     Heart block AV second degree     Hemorrhoids     History of shingles     History of stomach ulcers     HLD (hyperlipidemia)     Hx of migraines     Hx of migraines     Hypertension     Macular degeneration of right eye     PAF (paroxysmal atrial fibrillation)     Pneumonia          Review of Systems  General ROS: negative for chills, fever or weight loss  Cardiovascular ROS: no chest pain or dyspnea on exertion  Gastrointestinal ROS: no abdominal pain, change in bowel habits, or black/ bloody stools    Physical Examination  WD WM in NAD. PE is limited due to telelemedicine     Lab Results   Component Value Date    WBC 9.49 05/27/2020    HGB 10.1 (L) 05/27/2020    HCT 33.3 (L) 05/27/2020    MCV 90 05/27/2020     05/27/2020     Previous medical records reviewed with pertinent information included above   Previous VCE negative but study was suboptimal      Assessment:   Recurrent severe CHRISTINE requiring PRBC transfusion every ~6 months. There is a concern for AMAG here as well as a potential diagnosis of angioectasias as he had 5 angioectasias ablated in the duodenum in 2028       Plan:  Upper single balloon with gastric ph in addition  to angioectasia ablation    Aggressive iron supplementation     Loco Romo MD   [address], [address]   [place] [address] [phone]

## 2020-06-24 ENCOUNTER — DOCUMENT SCAN (OUTPATIENT)
Dept: HOME HEALTH SERVICES | Facility: HOSPITAL | Age: 85
End: 2020-06-24
Payer: MEDICARE

## 2020-06-26 ENCOUNTER — TELEPHONE (OUTPATIENT)
Dept: HEMATOLOGY/ONCOLOGY | Facility: CLINIC | Age: 85
End: 2020-06-26

## 2020-06-26 NOTE — TELEPHONE ENCOUNTER
rescheduled missed appt for July 6----- Message from Elizabeth Ariza sent at 6/26/2020  9:28 AM CDT -----  Regarding: returned call  Type:  Patient Returning Call    Who Called: Patient  Who Left Message for Patient: Unknown  Does the patient know what this is regarding?: Unknown  Would the patient rather a call back or a response via [employer]? Call back  Best Call Back Number: [phone]  Additional Information: n/a

## 2020-06-29 ENCOUNTER — DOCUMENT SCAN (OUTPATIENT)
Dept: HOME HEALTH SERVICES | Facility: HOSPITAL | Age: 85
End: 2020-06-29
Payer: MEDICARE

## 2020-07-06 ENCOUNTER — DOCUMENT SCAN (OUTPATIENT)
Dept: HOME HEALTH SERVICES | Facility: HOSPITAL | Age: 85
End: 2020-07-06
Payer: MEDICARE

## 2020-07-06 RX ORDER — SODIUM CHLORIDE 0.9 % (FLUSH) 0.9 %
10 SYRINGE (ML) INJECTION
Status: CANCELLED | OUTPATIENT
Start: 2020-07-17

## 2020-07-06 RX ORDER — HEPARIN 100 UNIT/ML
500 SYRINGE INTRAVENOUS
Status: CANCELLED | OUTPATIENT
Start: 2020-07-10

## 2020-07-06 RX ORDER — HEPARIN 100 UNIT/ML
500 SYRINGE INTRAVENOUS
Status: CANCELLED | OUTPATIENT
Start: 2020-07-17

## 2020-07-06 RX ORDER — SODIUM CHLORIDE 0.9 % (FLUSH) 0.9 %
10 SYRINGE (ML) INJECTION
Status: CANCELLED | OUTPATIENT
Start: 2020-07-10

## 2020-07-07 ENCOUNTER — DOCUMENT SCAN (OUTPATIENT)
Dept: HOME HEALTH SERVICES | Facility: HOSPITAL | Age: 85
End: 2020-07-07
Payer: MEDICARE

## 2020-07-08 ENCOUNTER — TELEPHONE (OUTPATIENT)
Dept: HEMATOLOGY/ONCOLOGY | Facility: CLINIC | Age: 85
End: 2020-07-08

## 2020-07-08 NOTE — TELEPHONE ENCOUNTER
Pt./significant other confirmed office visit with Dr. Trinidad at 10:30am on 9/21/20.  They request [facility] be notified to draw labs on 9/18/20 as ordered by Dr. Trinidad.  Assured pt. That [facility] will be notified of request.

## 2020-07-08 NOTE — TELEPHONE ENCOUNTER
Char, with [facility], confirmed Dr. Trinidad's lab orders for pt. to be drawn on 9/18/20.

## 2020-07-08 NOTE — TELEPHONE ENCOUNTER
YAMILET for pt. To confirm lab appt. On 9/18/20 and office visit with Dr. Trinidad at 10:30am on 9/21/20.

## 2020-07-13 ENCOUNTER — TELEPHONE (OUTPATIENT)
Dept: NEUROLOGY | Facility: HOSPITAL | Age: 85
End: 2020-07-13

## 2020-07-13 DIAGNOSIS — Z03.818 ENCOUNTER FOR OBSERVATION FOR SUSPECTED EXPOSURE TO OTHER BIOLOGICAL AGENTS RULED OUT: ICD-10-CM

## 2020-07-13 DIAGNOSIS — D50.0 IRON DEFICIENCY ANEMIA DUE TO CHRONIC BLOOD LOSS: Primary | ICD-10-CM

## 2020-07-13 DIAGNOSIS — F41.9 ANXIETY: ICD-10-CM

## 2020-07-13 NOTE — TELEPHONE ENCOUNTER
Messages placed on both home and mobile numbers.  Attempt to schedule upper sbe and covid 19 test.

## 2020-07-13 NOTE — TELEPHONE ENCOUNTER
"----- Message from Loco Romo MD sent at 7/13/2020  8:39 AM CDT -----  Regarding: RE: f/u endoscopy  Hi Mary Kay,   Please schedule this patient for his upper single balloon enteroscopy when able. I don't see it on the schedule.   Thanks!   Dr. Romo   ----- Message -----  From: Arnold Trinidad MD  Sent: 7/6/2020   1:17 PM CDT  To: Loco Romo MD  Subject: f/u endoscopy                                    Good afternoon,    Mr. Perez showed up in clinic this afternoon. I think he's iron deficient again and am planning on more IV iron for him. Your last note from June 2020 recommended "Upper single balloon with gastric ph in addition to angioectasia ablation."    He asked me to reach out to you to follow up on when you'd like to do that procedure.    Thanks!  Arnold      "

## 2020-07-14 ENCOUNTER — TELEPHONE (OUTPATIENT)
Dept: NEUROLOGY | Facility: HOSPITAL | Age: 85
End: 2020-07-14

## 2020-07-14 PROCEDURE — G0179 PR HOME HEALTH MD RECERTIFICATION: ICD-10-PCS | Mod: ,,, | Performed by: INTERNAL MEDICINE

## 2020-07-14 PROCEDURE — G0179 MD RECERTIFICATION HHA PT: HCPCS | Mod: ,,, | Performed by: INTERNAL MEDICINE

## 2020-07-14 NOTE — TELEPHONE ENCOUNTER
Upper SBE scheduled with Lesly Wei, girlfriend, on Monday, July 27, 2020 at [facility]. Covid 19 test scheduled on Friday, July 24, 2020 at [facility] at 910am at [address] in [place], [place].  Instructed to have pt eat light meals on Sunday, July 26, 2020 with nothing to eat or drink after midnight.  Instructions given to take bp the morning of procedure with a sip of water.  Lesly acknowledged understanding.

## 2020-07-14 NOTE — TELEPHONE ENCOUNTER
----- Message from Josie Coelho sent at 7/14/2020  9:36 AM CDT -----  Contact: Patient  Type:  Patient Returning Call    Who Called Patient  Who Left Message for Patient:Office  Does the patient know what this is regarding?:   Would the patient rather a call back or a response via [employer]?    Best Call Back Number: [phone]  Additional Information:

## 2020-07-15 RX ORDER — ALPRAZOLAM 0.5 MG/1
0.5 TABLET ORAL 2 TIMES DAILY PRN
Qty: 60 TABLET | Refills: 0 | Status: SHIPPED | OUTPATIENT
Start: 2020-07-15 | End: 2020-08-03

## 2020-07-21 ENCOUNTER — TELEPHONE (OUTPATIENT)
Dept: NEUROLOGY | Facility: HOSPITAL | Age: 85
End: 2020-07-21

## 2020-07-21 NOTE — TELEPHONE ENCOUNTER
----- Message from Meredith Werner sent at 7/16/2020  3:32 PM CDT -----  Contact: [phone] or [phone]  GI---Pt is returning the Office call,    Please call

## 2020-07-23 ENCOUNTER — TELEPHONE (OUTPATIENT)
Dept: ENDOSCOPY | Facility: HOSPITAL | Age: 85
End: 2020-07-23

## 2020-07-24 ENCOUNTER — TELEPHONE (OUTPATIENT)
Dept: ENDOSCOPY | Facility: HOSPITAL | Age: 85
End: 2020-07-24

## 2020-07-24 ENCOUNTER — LAB VISIT (OUTPATIENT)
Dept: INTERNAL MEDICINE | Facility: CLINIC | Age: 85
End: 2020-07-24
Payer: MEDICARE

## 2020-07-24 DIAGNOSIS — Z03.818 ENCOUNTER FOR OBSERVATION FOR SUSPECTED EXPOSURE TO OTHER BIOLOGICAL AGENTS RULED OUT: ICD-10-CM

## 2020-07-24 PROCEDURE — U0003 INFECTIOUS AGENT DETECTION BY NUCLEIC ACID (DNA OR RNA); SEVERE ACUTE RESPIRATORY SYNDROME CORONAVIRUS 2 (SARS-COV-2) (CORONAVIRUS DISEASE [COVID-19]), AMPLIFIED PROBE TECHNIQUE, MAKING USE OF HIGH THROUGHPUT TECHNOLOGIES AS DESCRIBED BY CMS-2020-01-R: HCPCS

## 2020-07-25 LAB — SARS-COV-2 RNA RESP QL NAA+PROBE: NOT DETECTED

## 2020-07-27 ENCOUNTER — HOSPITAL ENCOUNTER (OUTPATIENT)
Facility: HOSPITAL | Age: 85
Discharge: HOME | End: 2020-07-27
Attending: INTERNAL MEDICINE | Admitting: INTERNAL MEDICINE
Payer: MEDICARE

## 2020-07-27 ENCOUNTER — EXTERNAL HOME HEALTH (OUTPATIENT)
Dept: HOME HEALTH SERVICES | Facility: HOSPITAL | Age: 85
End: 2020-07-27
Payer: MEDICARE

## 2020-07-27 ENCOUNTER — DOCUMENT SCAN (OUTPATIENT)
Dept: HOME HEALTH SERVICES | Facility: HOSPITAL | Age: 85
End: 2020-07-27
Payer: MEDICARE

## 2020-07-27 ENCOUNTER — ANESTHESIA EVENT (OUTPATIENT)
Dept: ENDOSCOPY | Facility: HOSPITAL | Age: 85
End: 2020-07-27
Payer: MEDICARE

## 2020-07-27 ENCOUNTER — ANESTHESIA (OUTPATIENT)
Dept: ENDOSCOPY | Facility: HOSPITAL | Age: 85
End: 2020-07-27
Payer: MEDICARE

## 2020-07-27 VITALS
DIASTOLIC BLOOD PRESSURE: 68 MMHG | RESPIRATION RATE: 20 BRPM | WEIGHT: 150 LBS | TEMPERATURE: 98 F | BODY MASS INDEX: 21 KG/M2 | HEART RATE: 63 BPM | OXYGEN SATURATION: 100 % | HEIGHT: 71 IN | SYSTOLIC BLOOD PRESSURE: 123 MMHG

## 2020-07-27 DIAGNOSIS — D50.0 IRON DEFICIENCY ANEMIA DUE TO CHRONIC BLOOD LOSS: Primary | ICD-10-CM

## 2020-07-27 DIAGNOSIS — D50.9 IRON DEFICIENCY ANEMIA: ICD-10-CM

## 2020-07-27 LAB
ANION GAP SERPL CALC-SCNC: 5 MMOL/L (ref 8–16)
BASOPHILS # BLD AUTO: 0.01 K/UL (ref 0–0.2)
BASOPHILS NFR BLD: 0.2 % (ref 0–1.9)
BUN SERPL-MCNC: 13 MG/DL (ref 8–23)
CALCIUM SERPL-MCNC: 8.8 MG/DL (ref 8.7–10.5)
CHLORIDE SERPL-SCNC: 109 MMOL/L (ref 95–110)
CO2 SERPL-SCNC: 27 MMOL/L (ref 23–29)
CREAT SERPL-MCNC: 0.9 MG/DL (ref 0.5–1.4)
DIFFERENTIAL METHOD: ABNORMAL
EOSINOPHIL # BLD AUTO: 0.4 K/UL (ref 0–0.5)
EOSINOPHIL NFR BLD: 5.5 % (ref 0–8)
ERYTHROCYTE [DISTWIDTH] IN BLOOD BY AUTOMATED COUNT: 14.6 % (ref 11.5–14.5)
EST. GFR  (AFRICAN AMERICAN): >60 ML/MIN/1.73 M^2
EST. GFR  (NON AFRICAN AMERICAN): >60 ML/MIN/1.73 M^2
GLUCOSE SERPL-MCNC: 104 MG/DL (ref 70–110)
HCT VFR BLD AUTO: 31.5 % (ref 40–54)
HGB BLD-MCNC: 9.1 G/DL (ref 14–18)
IMM GRANULOCYTES # BLD AUTO: 0.01 K/UL (ref 0–0.04)
IMM GRANULOCYTES NFR BLD AUTO: 0.2 % (ref 0–0.5)
LYMPHOCYTES # BLD AUTO: 1 K/UL (ref 1–4.8)
LYMPHOCYTES NFR BLD: 16.4 % (ref 18–48)
MCH RBC QN AUTO: 23.7 PG (ref 27–31)
MCHC RBC AUTO-ENTMCNC: 28.9 G/DL (ref 32–36)
MCV RBC AUTO: 82 FL (ref 82–98)
MONOCYTES # BLD AUTO: 0.5 K/UL (ref 0.3–1)
MONOCYTES NFR BLD: 8 % (ref 4–15)
NEUTROPHILS # BLD AUTO: 4.4 K/UL (ref 1.8–7.7)
NEUTROPHILS NFR BLD: 69.7 % (ref 38–73)
NRBC BLD-RTO: 0 /100 WBC
PLATELET # BLD AUTO: 261 K/UL (ref 150–350)
PMV BLD AUTO: 11.8 FL (ref 9.2–12.9)
POTASSIUM SERPL-SCNC: 4 MMOL/L (ref 3.5–5.1)
RBC # BLD AUTO: 3.84 M/UL (ref 4.6–6.2)
SODIUM SERPL-SCNC: 141 MMOL/L (ref 136–145)
WBC # BLD AUTO: 6.34 K/UL (ref 3.9–12.7)

## 2020-07-27 PROCEDURE — 80048 BASIC METABOLIC PNL TOTAL CA: CPT

## 2020-07-27 PROCEDURE — 25000003 PHARM REV CODE 250: Performed by: INTERNAL MEDICINE

## 2020-07-27 PROCEDURE — 25000003 PHARM REV CODE 250: Performed by: NURSE ANESTHETIST, CERTIFIED REGISTERED

## 2020-07-27 PROCEDURE — 37000008 HC ANESTHESIA 1ST 15 MINUTES: Performed by: INTERNAL MEDICINE

## 2020-07-27 PROCEDURE — 85025 COMPLETE CBC W/AUTO DIFF WBC: CPT

## 2020-07-27 PROCEDURE — 37000009 HC ANESTHESIA EA ADD 15 MINS: Performed by: INTERNAL MEDICINE

## 2020-07-27 PROCEDURE — 27201030 HC OVERTUBE: Performed by: INTERNAL MEDICINE

## 2020-07-27 PROCEDURE — 27201238 HC BALLOON, OVERTUBE (ANY): Performed by: INTERNAL MEDICINE

## 2020-07-27 PROCEDURE — 44366 SMALL BOWEL ENDOSCOPY: CPT | Performed by: INTERNAL MEDICINE

## 2020-07-27 PROCEDURE — 63600175 PHARM REV CODE 636 W HCPCS: Performed by: NURSE ANESTHETIST, CERTIFIED REGISTERED

## 2020-07-27 PROCEDURE — 27202087 HC PROBE, APC: Performed by: INTERNAL MEDICINE

## 2020-07-27 PROCEDURE — 36415 COLL VENOUS BLD VENIPUNCTURE: CPT

## 2020-07-27 PROCEDURE — 44378 SMALL BOWEL ENDOSCOPY: CPT | Performed by: INTERNAL MEDICINE

## 2020-07-27 RX ORDER — PROPOFOL 10 MG/ML
VIAL (ML) INTRAVENOUS
Status: DISCONTINUED | OUTPATIENT
Start: 2020-07-27 | End: 2020-07-27

## 2020-07-27 RX ORDER — ONDANSETRON 2 MG/ML
INJECTION INTRAMUSCULAR; INTRAVENOUS
Status: DISCONTINUED | OUTPATIENT
Start: 2020-07-27 | End: 2020-07-27

## 2020-07-27 RX ORDER — SODIUM CHLORIDE 0.9 % (FLUSH) 0.9 %
10 SYRINGE (ML) INJECTION
Status: DISCONTINUED | OUTPATIENT
Start: 2020-07-27 | End: 2020-07-27 | Stop reason: HOSPADM

## 2020-07-27 RX ORDER — PROPOFOL 10 MG/ML
VIAL (ML) INTRAVENOUS CONTINUOUS PRN
Status: DISCONTINUED | OUTPATIENT
Start: 2020-07-27 | End: 2020-07-27

## 2020-07-27 RX ORDER — SODIUM CHLORIDE 9 MG/ML
INJECTION, SOLUTION INTRAVENOUS CONTINUOUS
Status: DISCONTINUED | OUTPATIENT
Start: 2020-07-27 | End: 2020-07-27 | Stop reason: HOSPADM

## 2020-07-27 RX ORDER — GLYCOPYRROLATE 0.2 MG/ML
INJECTION INTRAMUSCULAR; INTRAVENOUS
Status: DISCONTINUED | OUTPATIENT
Start: 2020-07-27 | End: 2020-07-27

## 2020-07-27 RX ORDER — ETOMIDATE 2 MG/ML
INJECTION INTRAVENOUS
Status: DISCONTINUED | OUTPATIENT
Start: 2020-07-27 | End: 2020-07-27

## 2020-07-27 RX ORDER — PHENYLEPHRINE HYDROCHLORIDE 10 MG/ML
INJECTION INTRAVENOUS
Status: DISCONTINUED | OUTPATIENT
Start: 2020-07-27 | End: 2020-07-27

## 2020-07-27 RX ADMIN — ONDANSETRON 4 MG: 2 INJECTION, SOLUTION INTRAMUSCULAR; INTRAVENOUS at 08:07

## 2020-07-27 RX ADMIN — ETOMIDATE 2 MG: 2 INJECTION, SOLUTION INTRAVENOUS at 09:07

## 2020-07-27 RX ADMIN — PROPOFOL 10 MG: 10 INJECTION, EMULSION INTRAVENOUS at 09:07

## 2020-07-27 RX ADMIN — SODIUM CHLORIDE: 0.9 INJECTION, SOLUTION INTRAVENOUS at 08:07

## 2020-07-27 RX ADMIN — TOPICAL ANESTHETIC 1 EACH: 200 SPRAY DENTAL; PERIODONTAL at 08:07

## 2020-07-27 RX ADMIN — PHENYLEPHRINE HYDROCHLORIDE 100 MCG: 10 INJECTION INTRAVENOUS at 09:07

## 2020-07-27 RX ADMIN — SODIUM CHLORIDE 20 ML/HR: 0.9 INJECTION, SOLUTION INTRAVENOUS at 07:07

## 2020-07-27 RX ADMIN — PROPOFOL 50 MCG/KG/MIN: 10 INJECTION, EMULSION INTRAVENOUS at 09:07

## 2020-07-27 RX ADMIN — ETOMIDATE 4 MG: 2 INJECTION, SOLUTION INTRAVENOUS at 09:07

## 2020-07-27 RX ADMIN — GLYCOPYRROLATE 0.2 MG: 0.2 INJECTION, SOLUTION INTRAMUSCULAR; INTRAVENOUS at 08:07

## 2020-07-27 NOTE — H&P
[facility]     CC: anemia      HPI 85 y.o. male with a history of CAD and AS presents for additional evaluation of a three year history of CHRISTINE requiring PRBC transfusion every ~6 months. He denies associated gross rectal bleeding or melena. He has a history of B12 and iron deficiency in his personal history as well as family history.      Past Medical History       Past Medical History:   Diagnosis Date    Adenomatous polyps      Anemia      Aortic stenosis      Arthritis      AS (aortic stenosis)      Asthma      BBB (bundle branch block)      Cataract      COPD (chronic obstructive pulmonary disease)      Coronary artery disease      Encounter for blood transfusion      GERD (gastroesophageal reflux disease)      GI bleed      H/O food poisoning      H/O: GI bleed      Heart block AV second degree      Hemorrhoids      History of shingles      History of stomach ulcers      HLD (hyperlipidemia)      Hx of migraines      Hx of migraines      Hypertension      Macular degeneration of right eye      PAF (paroxysmal atrial fibrillation)      Pneumonia       Review of Systems  General ROS: negative for chills, fever or weight loss  Cardiovascular ROS: no chest pain or dyspnea on exertion  Gastrointestinal ROS: no abdominal pain, change in bowel habits, or black/ bloody stools     Physical Examination    Gen - alert, NAD, cooperative  Heart - RRR, no murmurs  Lungs - CTAB, no wheezes  Abd - soft, NT, ND  Ext - symmetric, no edema             Lab Results   Component Value Date     WBC 9.49 05/27/2020     HGB 10.1 (L) 05/27/2020     HCT 33.3 (L) 05/27/2020     MCV 90 05/27/2020      05/27/2020     Previous medical records reviewed with pertinent information included above   Previous VCE negative but study was suboptimal       Assessment:   Recurrent severe CHRISTINE requiring PRBC transfusion every ~6 months. There is a concern for AMAG here as well as a potential diagnosis of  angioectasias as he had 5 angioectasias ablated in the duodenum in 2018      Plan:  Upper single balloon with gastric ph in addition to angioectasia ablation today    Continue aggressive iron supplementation      Loco Romo MD   [address] [facility] [facility], [address]   [place] [address] [phone]

## 2020-07-27 NOTE — PROGRESS NOTES
60 Day Recert Order # [id number]  New Cert Period: 07/14 to 09/11/2020 with [facility] - Dr. Jade Matute

## 2020-07-27 NOTE — [employer] PATIENT INSTRUCTIONS
Discharge Summary/Instructions after an Endoscopic Procedure  Patient Name: Kemar Perez  Patient MRN: [id number]  Patient YOB: 1934 Monday, July 27, 2020  Loco Romo MD  Your health is very important to us during the Covid Crisis. Following your   procedure today, you will receive a daily text for 2 weeks asking about   signs or symptoms of Covid 19.  Please respond to this text when you   receive it so we can follow up and keep you as safe as possible.   RESTRICTIONS:  During your procedure today, you received medications for sedation.  These   medications may affect your judgment, balance and coordination.  Therefore,   for 24 hours, you have the following restrictions:   - DO NOT drive a car, operate machinery, make legal/financial decisions,   sign important papers or drink alcohol.    ACTIVITY:  Today: no heavy lifting, straining or running due to procedural   sedation/anesthesia.  The following day: return to full activity including work.  DIET:  Eat and drink normally unless instructed otherwise.     TREATMENT FOR COMMON SIDE EFFECTS:  - Mild abdominal pain, nausea, belching, bloating or excessive gas:  rest,   eat lightly and use a heating pad.  - Sore Throat: treat with throat lozenges and/or gargle with warm salt   water.  - Because air was used during the procedure, expelling large amounts of air   from your rectum or belching is normal.  - If a bowel prep was taken, you may not have a bowel movement for 1-3 days.    This is normal.  SYMPTOMS TO WATCH FOR AND REPORT TO YOUR PHYSICIAN:  1. Abdominal pain or bloating, other than gas cramps.  2. Chest pain.  3. Back pain.  4. Signs of infection such as: chills or fever occurring within 24 hours   after the procedure.  5. Rectal bleeding, which would show as bright red, maroon, or black stools.   (A tablespoon of blood from the rectum is not serious, especially if   hemorrhoids are present.)  6. Vomiting.  7. Weakness or dizziness.  GO  DIRECTLY TO THE NEAREST EMERGENCY ROOM IF YOU HAVE ANY OF THE FOLLOWING:      Difficulty breathing              Chills and/or fever over 101 F   Persistent vomiting and/or vomiting blood   Severe abdominal pain   Severe chest pain   Black, tarry stools   Bleeding- more than one tablespoon   Any other symptom or condition that you feel may need urgent attention  Your doctor recommends these additional instructions:  If any biopsies were taken, your doctors clinic will contact you in 1 to 2   weeks with any results.  Aggressive iron supplementation as next step including parenteral iron to   improve iron stores followed by periodic maintenance   If anemia fails to improve, I will consider a 6 month trial of sandostatin   injections   Discharge to home   Condition stable   Resume previous diet   The signs and symptoms of potential delayed complications were discussed   with the patient. If signs or symptoms of these complications develop, call   the [facility] at [phone].   Return to normal activities tomorrow.  Written discharge instructions were   provided to the patient.  For questions, problems or results please call your physician - Loco Romo MD.  EMERGENCY PHONE NUMBER: [phone],  LAB RESULTS: [phone]  IF A COMPLICATION OR EMERGENCY SITUATION ARISES AND YOU ARE UNABLE TO REACH   YOUR PHYSICIAN - GO DIRECTLY TO THE EMERGENCY ROOM.  MD Loco Landrum MD  7/27/2020 9:58:45 AM  This report has been verified and signed electronically.  [employer]

## 2020-07-27 NOTE — ANESTHESIA PREPROCEDURE EVALUATION
07/27/2020  Kemar Perez is a 85 y.o., male for upper SBE under MAC/GA    Past Medical History:   Diagnosis Date    Anemia     Aortic stenosis     Arthritis     AS (aortic stenosis)     Asthma     BBB (bundle branch block)     Cataract     COPD (chronic obstructive pulmonary disease)     Coronary artery disease     Encounter for blood transfusion     GERD (gastroesophageal reflux disease)     GI bleed     H/O food poisoning     H/O: GI bleed     Heart block AV second degree     Hemorrhoids     History of shingles     History of stomach ulcers     HLD (hyperlipidemia)     Hx of migraines     Hx of migraines     Hypertension     Macular degeneration of right eye     PAF (paroxysmal atrial fibrillation)     Pneumonia      Past Surgical History:   Procedure Laterality Date    CARDIAC ANGIOGRAM WITH STENTS      CARDIAC PACEMAKER PLACEMENT      CATARACT EXTRACTION      CATARACT SX Bilateral     COLONOSCOPY N/A 4/9/2019    Procedure: COLONOSCOPY;  Surgeon: Sebastien Worthy MD;  Location: [facility];  Service: Endoscopy;  Laterality: N/A;    CORONARY ARTERY BYPASS GRAFT      CTR Right     ESOPHAGOGASTRODUODENOSCOPY N/A 4/8/2019    Procedure: EGD (ESOPHAGOGASTRODUODENOSCOPY);  Surgeon: Sebastien Worthy MD;  Location: [facility];  Service: Endoscopy;  Laterality: N/A;    ESOPHAGOGASTRODUODENOSCOPY N/A 10/17/2019    Procedure: EGD (ESOPHAGOGASTRODUODENOSCOPY);  Surgeon: Fabrice Helm MD;  Location: [facility];  Service: Endoscopy;  Laterality: N/A;  NPO, bed, 2uprbc since last night    JOINT REPLACEMENT      TOTAL KNEE ARTHROPLASTY Right     WRIST JOINT REMOVAL Right          Pre-op Assessment    I have reviewed the Patient Summary Reports.    I have reviewed the NPO Status.   I have reviewed the Medications.     Review of Systems  Anesthesia Hx:  No previous  Anesthesia   Denies Personal Hx of Anesthesia complications.   Social:  Non-Smoker    Hematology/Oncology:         -- Anemia:   Cardiovascular:   Exercise tolerance: poor Pacemaker (placed 3/18) Hypertension Valvular problems/Murmurs, AS CAD (cabg in past with later stents) asymptomatic CABG/stent Dysrhythmias  Angina CHF PVD hyperlipidemia DHALIWAL Last angiogram 3/17 showed patent lima and saphenous graft..lad and lf main ok..rca occluded   Pulmonary:   COPD (home oxygen) Asthma Shortness of breath    Renal/:   Chronic Renal Disease, CRI    Hepatic/GI:   GERD    Neurological:  Neurology Normal    Endocrine:  Endocrine Normal        Physical Exam  General:  Well nourished    Airway/Jaw/Neck:  Airway Findings: Mallampati: II TM Distance: 4 - 6 cm      Dental:  Dental Findings:   Chest/Lungs:  Chest/Lungs Findings: Tachypnea, Decreased Breathe Sounds Left, Decreased Breathe Sounds Right     Heart/Vascular:  Heart Findings: Rate: Normal  Rhythm: Regular Rhythm  Heart Murmur  Systolic        Mental Status:  Mental Status Findings:  Cooperative, Alert and Oriented       Lab Results   Component Value Date    WBC 6.34 07/27/2020    HGB 9.1 (L) 07/27/2020    HCT 31.5 (L) 07/27/2020     07/27/2020    CHOL 167 08/27/2019    TRIG 51 08/27/2019    HDL 63 08/27/2019    ALT 10 04/14/2020    AST 13 04/14/2020     07/27/2020    K 4.0 07/27/2020     07/27/2020    CREATININE 0.9 07/27/2020    BUN 13 07/27/2020    CO2 27 07/27/2020    TSH 0.486 03/13/2020    INR 1.0 10/17/2019    HGBA1C 5.6 04/06/2019 4/2019  Nonrheumatic aortic valve stenosis  -No recent echocardiogram at [facility]  -The AS was moderately severe by previous assessments   -Will consider TTE at [facility]    4/7:  -Obtain last echo from patient's cardiologist 2/20/18  -EF 60%, JEANETH 1.3cm2, mean gradient 26.5mmHg          Anesthesia Plan  Type of Anesthesia, risks & benefits discussed:  Anesthesia Type:  general, MAC  Patient's Preference:   Intra-op  Monitoring Plan: standard ASA monitors  Intra-op Monitoring Plan Comments:   Post Op Pain Control Plan:   Post Op Pain Control Plan Comments:   Induction:    Beta Blocker:  Patient is not currently on a Beta-Blocker (No further documentation required).       Informed Consent: Patient understands risks and agrees with Anesthesia plan.  Questions answered. Anesthesia consent signed with patient.  ASA Score: 4     Day of Surgery Review of History & Physical: I have interviewed and examined the patient. I have reviewed the patient's H&P dated:    H&P update referred to the provider.     Anesthesia Plan Notes: Pt is high risk for cardiopulmonary decompensation with anesthesia.        Ready For Surgery From Anesthesia Perspective.

## 2020-07-27 NOTE — TRANSFER OF CARE
"Anesthesia Transfer of Care Note    Patient: Kemar Perez    Procedure(s) Performed: Procedure(s) (LRB):  ENTEROSCOPY, upper sbe with gastric ph (N/A)    Patient location: GI    Anesthesia Type: MAC    Transport from OR: Transported from OR on room air with adequate spontaneous ventilation    Post pain: adequate analgesia    Post assessment: no apparent anesthetic complications and tolerated procedure well    Post vital signs: stable    Level of consciousness: awake and alert    Nausea/Vomiting: no nausea/vomiting    Complications: none    Transfer of care protocol was followed      Last vitals:   Visit Vitals  BP (!) 161/85 (Patient Position: Lying)   Pulse 66   Temp 36.4 °C (97.5 °F) (Temporal)   Resp 20   Ht 5' 11" (1.803 m)   Wt 68 kg (150 lb)   SpO2 97%   BMI 20.92 kg/m²     "

## 2020-07-27 NOTE — PLAN OF CARE
Pt. Procedure completed with no complications.Pt. to remain in recovery for 30 min and stable.VSS.No distress noted at this time will continue to monitor.

## 2020-07-27 NOTE — ANESTHESIA POSTPROCEDURE EVALUATION
Anesthesia Post Evaluation    Patient: Kemar Perez    Procedure(s) Performed: Procedure(s) (LRB):  ENTEROSCOPY, upper sbe with gastric ph (N/A)    Final Anesthesia Type: MAC    Patient location during evaluation: GI PACU  Patient participation: Yes- Able to Participate  Level of consciousness: awake and alert and oriented  Post-procedure vital signs: reviewed and stable  Pain management: adequate  Airway patency: patent    PONV status at discharge: No PONV  Anesthetic complications: no      Cardiovascular status: blood pressure returned to baseline, hemodynamically stable and stable  Respiratory status: unassisted, spontaneous ventilation and nasal cannula  Hydration status: euvolemic  Follow-up not needed.          Vitals Value Taken Time   /76 07/27/20 0941   Temp 98.7 07/27/20 0941   Pulse 64 07/27/20 0941   Resp 12 07/27/20 0941   SpO2 98% 07/27/20 0941         No case tracking events are documented in the log.      Pain/Kourtney Score: Kourtney Score: 9 (7/27/2020  9:39 AM)

## 2020-08-14 DIAGNOSIS — J44.9 CHRONIC OBSTRUCTIVE PULMONARY DISEASE, UNSPECIFIED COPD TYPE: ICD-10-CM

## 2020-08-14 RX ORDER — FLUTICASONE FUROATE, UMECLIDINIUM BROMIDE AND VILANTEROL TRIFENATATE 100; 62.5; 25 UG/1; UG/1; UG/1
1 POWDER RESPIRATORY (INHALATION) DAILY
Qty: 30 EACH | Refills: 11 | Status: SHIPPED | OUTPATIENT
Start: 2020-08-14 | End: 2021-03-31 | Stop reason: ALTCHOICE

## 2020-08-14 NOTE — TELEPHONE ENCOUNTER
----- Message from Emily De Jesus sent at 2020 11:08 AM CDT -----  Contact: [facility]  Kemar Perez  MRN: [id number]  : 1934  PCP: Jade Matute  Home Phone      [phone]  Work Phone      Not on file.  Mobile          [phone]      MESSAGE:   Refill  TRELEGY ELLIPTA 100-62.5-25 mcg [facility] [place] [facility] [address]    [phone]

## 2020-09-08 ENCOUNTER — TELEPHONE (OUTPATIENT)
Dept: INTERNAL MEDICINE | Facility: CLINIC | Age: 85
End: 2020-09-08

## 2020-09-08 DIAGNOSIS — F41.9 ANXIETY: ICD-10-CM

## 2020-09-08 RX ORDER — ALPRAZOLAM 0.5 MG/1
0.5 TABLET ORAL 2 TIMES DAILY
Qty: 60 TABLET | Refills: 0 | Status: CANCELLED | OUTPATIENT
Start: 2020-09-08

## 2020-09-08 NOTE — TELEPHONE ENCOUNTER
----- Message from Emily De Jesus sent at 2020  3:33 PM CDT -----  Contact: [facility]  Kemar Perez  MRN: [id number]  : 1934  PCP: Jade Matute  Home Phone      [phone]  Work Phone      Not on file.  Mobile          [phone]      MESSAGE:   Refill  ALPRAZolam (XANAX) 0.5 MG tablet    [facility] - [place] - [address] [facility]    [phone]

## 2020-09-19 NOTE — PROGRESS NOTES
PATIENT: Kemar Perez  MRN: [id number]  DATE: 9/19/2020    Diagnosis:   1. Iron deficiency anemia due to chronic blood loss    2. 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 Chronic diastolic CHF (congestive heart failure)    4. Physical deconditioning      Chief Complaint: iron deficiency anemia    Subjective:    History of Present Illness: Mr. Perez is a 86 y.o. male who presented in November 2019 for evaluation and management of iron deficiency anemia. He was referred by gastroenterologist Dr. Cohen.    Telemedicine visit:  The patient location is: home  The chief complaint leading to consultation is: iron deficiency anemia    Visit type: audio only    Face to Face time with patient: 15 minutes  25 minutes of total time spent on the encounter, which includes face to face time and non-face to face time preparing to see the patient (eg, review of tests), Obtaining and/or reviewing separately obtained history, Documenting clinical information in the electronic or other health record, Independently interpreting results (not separately reported) and communicating results to the patient/family/caregiver, or Care coordination (not separately reported).     Each patient to whom he or she provides medical services by telemedicine is:  (1) informed of the relationship between the physician and patient and the respective role of any other health care provider with respect to management of the patient; and (2) notified that he or she may decline to receive medical services by telemedicine and may withdraw from such care at any time.    Notes: see below      - labs dating back to January 2018 reveal a moderate normocytic anemia. Iron studies reveal a decreased ferritin/iron/saturated iron.  - it appears that he likely has angioectasias in his small intestines per his most recent note by Dr. Cohen (10/9/19).  - since April 2019, he has received 5 units of blood due to recurrent, symptomatic anemia.  - he  received ferric carboxymaltose on 11/13/19 and 11/20/19.    Interval history:  - he presents for a follow-up appointment for his iron deficiency anemia.  - today, he is doing well. He states he is unable to come in for labs/appointment due to transportation issues.   - today, he reports fatigue, dyspnea upon exertion, generalized weakness. He denies chest pain, nausea, vomiting, diarrhea, constipation.    Past medical, surgical, family, and social histories have been reviewed and updated below.    Past Medical History:   Past Medical History:   Diagnosis Date    Anemia     Aortic stenosis     Arthritis     AS (aortic stenosis)     Asthma     BBB (bundle branch block)     Cataract     COPD (chronic obstructive pulmonary disease)     Coronary artery disease     Encounter for blood transfusion     GERD (gastroesophageal reflux disease)     GI bleed     H/O food poisoning     H/O: GI bleed     Heart block AV second degree     Hemorrhoids     History of shingles     History of stomach ulcers     HLD (hyperlipidemia)     Hx of migraines     Hx of migraines     Hypertension     Macular degeneration of right eye     PAF (paroxysmal atrial fibrillation)     Pneumonia        Past Surgical History:   Past Surgical History:   Procedure Laterality Date    CARDIAC ANGIOGRAM WITH STENTS      CARDIAC PACEMAKER PLACEMENT      CATARACT EXTRACTION      CATARACT SX Bilateral     COLONOSCOPY N/A 4/9/2019    Procedure: COLONOSCOPY;  Surgeon: Sebastien Worthy MD;  Location: [facility];  Service: Endoscopy;  Laterality: N/A;    CORONARY ARTERY BYPASS GRAFT      CTR Right     ESOPHAGOGASTRODUODENOSCOPY N/A 4/8/2019    Procedure: EGD (ESOPHAGOGASTRODUODENOSCOPY);  Surgeon: Sebastien Worthy MD;  Location: [facility];  Service: Endoscopy;  Laterality: N/A;    ESOPHAGOGASTRODUODENOSCOPY N/A 10/17/2019    Procedure: EGD (ESOPHAGOGASTRODUODENOSCOPY);  Surgeon: Fabrice Helm MD;  Location: [facility]  ENDO;  Service: Endoscopy;  Laterality: N/A;  NPO, bed, 2uprbc since last night    JOINT REPLACEMENT      SMALL BOWEL ENTEROSCOPY N/A 7/27/2020    Procedure: ENTEROSCOPY, upper sbe with gastric ph;  Surgeon: Loco Romo MD;  Location: [facility] ENDO;  Service: Endoscopy;  Laterality: N/A;    TOTAL KNEE ARTHROPLASTY Right     WRIST JOINT REMOVAL Right        Family History:   Family History   Problem Relation Age of Onset    Heart disease Paternal Grandmother     Hypertension Paternal Grandmother     Heart disease Paternal Grandfather     Hypertension Paternal Grandfather     Heart disease Father     Hypertension Father        Social History:  reports that he quit smoking about 37 years ago. His smoking use included cigarettes. He started smoking about 68 years ago. He smoked 1.00 pack per day. He has never used smokeless tobacco. He reports that he does not drink alcohol or use drugs.    Allergies:  Review of patient's allergies indicates:   Allergen Reactions    Dexamethasone Other (See Comments)     High blood pressure    Mobic [meloxicam] Other (See Comments)     Bleeds easily    Nsaids (non-steroidal anti-inflammatory drug) Other (See Comments)     Bleeding     Spiriva respimat [tiotropium bromide] Other (See Comments)     Dry mouth       Medications:  Current Outpatient Medications   Medication Sig Dispense Refill    acetaminophen (TYLENOL) 500 MG tablet Take 500 mg by mouth every 6 (six) hours as needed for Pain.      albuterol-ipratropium (DUO-NEB) 2.5 mg-0.5 mg/3 mL nebulizer solution Use 1 vial per NEBULIZER EVERY 6 HOURS AS NEEDED for wheezing 180 mL 3    ALPRAZolam (XANAX) 0.5 MG tablet TAKE ONE TABLET BY MOUTH TWICE DAILY AS NEEDED FOR ANXIETY 60 tablet 0    aspirin (ECOTRIN) 81 MG EC tablet Take 1 tablet (81 mg total) by mouth once daily. 90 tablet 3    cyanocobalamin 1,000 mcg/mL injection Inject 1 mL (1,000 mcg total) into the muscle every 28 days. 10 mL 3    cyanocobalamin,  vitamin B-12, 1,000 mcg/mL Kit INJECT ONE ML INTO THE MUSCLES EVERY MONTH 1 kit 11    FERROCITE 324 mg (106 mg iron) Tab TAKE ONE HALF TABLET BY MOUTH EVERY MORNING 30 tablet 0    furosemide (LASIX) 40 MG tablet Take 40 mg by mouth once daily.      hydroCHLOROthiazide (HYDRODIURIL) 25 MG tablet Take 25 mg by mouth once daily.       isosorbide mononitrate (IMDUR) 60 MG 24 hr tablet Take 60 mg by mouth once daily.       metoprolol succinate (TOPROL-XL) 25 MG 24 hr tablet Take 25 mg by mouth once daily.  11    pantoprazole (PROTONIX) 40 MG tablet Take 40 mg by mouth once daily.      potassium chloride SA (K-DUR,KLOR-CON) 20 MEQ tablet Take 20 mEq by mouth 2 (two) times daily.  11    rOPINIRole (REQUIP) 1 MG tablet ropinirole 1 mg tablet QHS 30 tablet 5    rosuvastatin (CRESTOR) 20 MG tablet Take 20 mg by mouth once daily.   11    traZODone (DESYREL) 100 MG tablet TAKE TWO TABLETS BY MOUTH EVERY EVENING 30 tablet 5    TRELEGY ELLIPTA 100-62.5-25 mcg DsDv Take 1 puff by mouth once daily. 30 each 11    VENTOLIN HFA 90 mcg/actuation inhaler INHALE 2 puffs into the lungs EVERY 6 HOURS AS NEEDED for wheezing 18 g 3     No current facility-administered medications for this visit.        Review of Systems   Constitutional: Positive for fatigue.   HENT: Negative for sore throat.    Eyes: Negative for visual disturbance.   Respiratory: Positive for shortness of breath.    Cardiovascular: Negative for chest pain.   Gastrointestinal: Positive for blood in stool. Negative for abdominal pain.   Genitourinary: Negative for dysuria.   Musculoskeletal: Negative for back pain.   Skin: Negative for rash.   Neurological: Positive for weakness. Negative for headaches.   Hematological: Negative for adenopathy.   Psychiatric/Behavioral: The patient is not nervous/anxious.        ECOG Performance Status:   ECOG SCORE 1     Objective:      Vitals:   There were no vitals filed for this visit.  BMI: There is no height or weight on  file to calculate BMI.  Deferred since telemedicine visit.    Physical Exam   Deferred since telemedicine visit.    Laboratory Data:  Labs have been reviewed.    Lab Results   Component Value Date    WBC 6.34 07/27/2020    HGB 9.1 (L) 07/27/2020    HCT 31.5 (L) 07/27/2020    MCV 82 07/27/2020     07/27/2020         Imaging:     Upper GI endoscopy (10/17/19):  - Small hiatal hernia.  - Normal stomach.  - Normal examined duodenum.  - No specimens collected.    Colonoscopy (4/9/19):  - Diverticulosis in the sigmoid colon.  - The examination was otherwise normal.  - No specimens collected.    CT abdomen/pelvis (10/16/19):  The visualized portion of the base of the lungs is significant for mild bilateral basilar atelectatic versus fibrotic change.  The visualized portion of the heart is significant for calcification of the right coronary artery.  The stomach and spleen are unremarkable.  There is fatty atrophy of the pancreas.  The liver, gallbladder, and adrenal glands are unremarkable.  The visualized portion of the aorta is significant for extensive scattered plaque which extends into its branches.  The left kidney contains a subcentimeter hypodensity too small to characterize.  The right kidney is unremarkable.  The bladder is unremarkable.  The bowel is unremarkable.  The appendix has a normal appearance.  The osseous structures demonstrate degenerative change    Assessment:       1. Iron deficiency anemia due to chronic blood loss    2. 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 Chronic diastolic CHF (congestive heart failure)    4. Physical deconditioning         Plan:     1. Iron deficiency anemia due to chronic blood loss  - I have reviewed his labs  - labs dating back to January 2018 reveal a moderate normocytic anemia. Iron studies reveal a decreased ferritin/iron/saturated iron. These findings are consistent with iron deficiency anemia.  - given the severity of his anemia and iron  deficiency, I recommended ferric carboxymaltose x 2 doses.  - he received ferric carboxymaltose on 11/13/19 and 11/20/19.  - unfortunately, he appears to be having recurrent gastrointestinal bleeding.  - labs from July 2020 reveal recurrent iron deficiency.  - he is taking oral iron.  - I offered to schedule him for more ferric carboxymaltose, but he declined. He states he is unable to come here due to transportation issues. I stated that we can set up transportation for him, but he declined.  - I will arrange for home health to get labs drawn to see the current state of his iron deficiency anemia.  - return to clinic in two months with repeat iron studies.    2. Angiodysplasia of duodenum  - Dr. Cohen feels his iron deficiency is secondary to angiodysplasias in his small intestines  - unfortunately, he appears to be having recurrent gastrointestinal bleeding.  - defer further management to gastroenterology    3. Advance Care Planning     Power of   I initiated the process of advance care planning today and explained the importance of this process to the patient.  I introduced the concept of advance directives to the patient, as well. Then the patient received detailed information about the importance of designating a Health Care Power of  (HCPOA). He was also instructed to communicate with this person about their wishes for future healthcare, should he become sick and lose decision-making capacity. The patient has not previously appointed a HCPOA. After our discussion, the patient has decided to complete a HCPOA and has appointed his friend John Wei ([phone]).           - return to clinic in two months with repeat iron studies.    Arnold Trinidad M.D.  Hematology/Oncology  [facility] - [address], [address]  [place] [address]  Phone: [phone]  Fax: [phone]

## 2020-09-21 ENCOUNTER — TELEPHONE (OUTPATIENT)
Dept: HEMATOLOGY/ONCOLOGY | Facility: CLINIC | Age: 85
End: 2020-09-21

## 2020-09-21 ENCOUNTER — OFFICE VISIT (OUTPATIENT)
Dept: HEMATOLOGY/ONCOLOGY | Facility: CLINIC | Age: 85
End: 2020-09-21
Payer: MEDICARE

## 2020-09-21 DIAGNOSIS — I50.32 CHRONIC DIASTOLIC CHF (CONGESTIVE HEART FAILURE): ICD-10-CM

## 2020-09-21 DIAGNOSIS — R53.81 PHYSICAL DECONDITIONING: ICD-10-CM

## 2020-09-21 DIAGNOSIS — K31.811 DUODENAL HEMORRHAGE DUE TO ANGIODYSPLASIA OF DUODENUM: ICD-10-CM

## 2020-09-21 DIAGNOSIS — D50.0 IRON DEFICIENCY ANEMIA DUE TO CHRONIC BLOOD LOSS: Primary | ICD-10-CM

## 2020-09-21 PROCEDURE — 99443 PR PHYSICIAN TELEPHONE EVALUATION 21-30 MIN: ICD-10-PCS | Mod: 95,,, | Performed by: INTERNAL MEDICINE

## 2020-09-21 PROCEDURE — 99443 PR PHYSICIAN TELEPHONE EVALUATION 21-30 MIN: CPT | Mod: 95,,, | Performed by: INTERNAL MEDICINE

## 2020-09-21 NOTE — TELEPHONE ENCOUNTER
Pt. Refused transportation services with [facility].  He requests home health orders.  Assured him Dr. Trinidad will place order and  will coordinate.  Pt. Verbalized understanding.    ----- Message from Jacqueline Gar LCSW sent at 7/30/2020  4:25 PM CDT -----  Regarding: RE:Transportation assist. f/u regarding IV iron  Ok, keep me posted.   You're welcome!  ----- Message -----  From: Adrian Miranda RN  Sent: 7/30/2020   3:20 PM CDT  To: Jacqueline Gar LCSW  Subject: RE:Transportation assist. f/u regarding IV i#    Kala Phillips,  I'm pretty sure he's ambulatory and probably can walk up a few steps but to confirm I left voicemail for pt. To call office.  Thanks so much for all your help!  ~Adrian  ----- Message -----  From: Jacqueline Gar LCSW  Sent: 7/30/2020   2:08 PM CDT  To: Adrian Miranda RN  Subject: RE:Transportation assist. f/u regarding IV i#    Hi Adrian,    I'm unable to set up transport for patient as both our [employer] and the [employer] [place] are for patients with cancer diagnoses. I looked through his list of diagnoses, but no cancer.    I called the [facility] office [phone], and spoke with Maria Victoria in the Transportation Dept. She said [facility] or [facility]ulatory - she asked if patient would be able to go up/down steps to get into the van. She said Tues or Thurs would be the best days as they currently  a dialysis patient in [facility] on Tuesday and Thursday mornings between 7:30 - 8 AM and could  patient within this same time frame to go to [facility] for the iron infusions. Patient will need to call the [facility] to speak with Maria Victoria/staff to provide his info and schedule transports; he can call as soon as he has appointments scheduled but no later than 1 PM the day before each appointment. Their office hours are Monday through Friday between 8:30 AM and 4:30  PM. Patient could utilize this transportation for other medical appointments too.     Do you think patient is safe/able to go up/down the steps to get in the van, and can he navigate himself through the hospital/clinic?          ----- Message -----  From: Adrian Miranda RN  Sent: 7/29/2020   4:19 PM CDT  To: Jacqueline Gar [facility]  Subject: FW:Transportation assist. f/u regarding IV i#    Hi Jacqueline,  Would you be able to assist With setting up transportation for pt. to get to and from his appt.'s?  He will need IV iron infusions in [facility].  PLease let us know if there's anything we can do to help facilitate this.    Thank you,    Adrian  ----- Message -----  From: Arnold Trinidad MD  Sent: 7/29/2020   2:33 PM CDT  To: Adrian Miranda RN, Gustavo Mai RN  Subject: RE: f/u regarding IV iron                        Hmm that is a thought. I think he would benefit from IV iron, as his anemia is pretty bad. Not sure how this works though with transportation.    Adrian, do you know how transportation works? See below messages for full information.    Thanks!Arnold  ----- Message -----  From: Gustavo Mai RN  Sent: 7/29/2020   1:37 PM CDT  To: Arnold Trinidad MD  Subject: RE: f/u regarding IV iron                        My only other thoughts would be Adrian or I can contact case management to see if maybe they can set up transportation  with [facility] or something similar organization.       ----- Message -----  From: Arnold Trinidad MD  Sent: 7/29/2020   1:33 PM CDT  To: Gustavo Mai RN  Subject: RE: f/u regarding IV iron                        Ok thanks for the update!    Arnold  ----- Message -----  From: Gustavo Mai RN  Sent: 7/29/2020  10:58 AM CDT  To: Arnold Trinidad MD  Subject: RE: f/u regarding IV iron                        I scheduled patient but  he didn't show for appointment. When I called Pt he reported that the lady that drive him just fell today. He is unable to make appointment and doesn't want to  reschedule. He reports he will take oral Iron. Please let me know if there is anything else you need me to do.  Sorry I was unable to get patient in for his iron infusion.     Thanks Gustavo    ----- Message -----  From: Arnold Trinidad MD  Sent: 7/27/2020  11:28 AM CDT  To: Adrian Miranda RN, Gustavo Mai RN  Subject: f/u regarding IV iron                            Good morning,    Gustavo, I see that you had tried calling him 1-2x in July to schedule the iron, but he had transportation issues. Would you mind following back up with him to schedule the IV iron? He had his endoscopy today, and gastroenterology asked me about getting him in for the IV iron.    Thanks so much!  Arnold

## 2020-09-21 NOTE — TELEPHONE ENCOUNTER
----- Message from Jacqueline Gar LCSW sent at 7/30/2020  4:25 PM CDT -----  Regarding: RE:Transportation assist. f/u regarding IV iron  Ok, keep me posted.   You're welcome!  ----- Message -----  From: Adrian Miranda RN  Sent: 7/30/2020   3:20 PM CDT  To: Jacqueline Gar LCSW  Subject: RE:Transportation assist. f/u regarding IV i#    Kala Phillips,  I'm pretty sure he's ambulatory and probably can walk up a few steps but to confirm I left voicemail for pt. To call office.  Thanks so much for all your help!  ~Adrian  ----- Message -----  From: Jacqueline Gar LCSW  Sent: 7/30/2020   2:08 PM CDT  To: Adrian Miranda RN  Subject: RE:Transportation assist. f/u regarding IV i#    Lars Campbell,    I'm unable to set up transport for patient as both our [employer] and the [facility] [place] are for patients with cancer diagnoses. I looked through his list of diagnoses, but no cancer.    I called the [facility] office [phone], and spoke with Maria Victoria in the Transportation Dept. She said they could transport patient as a curb to curb service, wheelchair or ambulatory - she asked if patient would be able to go up/down steps to get into the van. She said Tues or Thurs would be the best days as they currently  a dialysis patient in [facility] on Tuesday and Thursday mornings between 7:30 - 8 AM and could  patient within this same time frame to go to [facility] for the iron infusions. Patient will need to call the [facility] to speak with Maria Victoria/staff to provide his info and schedule transports; he can call as soon as he has appointments scheduled but no later than 1 PM the day before each appointment. Their office hours are Monday through Friday between 8:30 AM and 4:30 PM. Patient could utilize this transportation for other medical appointments too.     Do you think patient is safe/able to go up/down the steps to get in the van, and can he navigate himself through the  hospital/clinic?          ----- Message -----  From: Adrian Miranda RN  Sent: 7/29/2020   4:19 PM CDT  To: Jacqueline Gar [facility]  Subject: FW:Transportation assist. f/u regarding IV i#    Lars Phillips,  Would you be able to assist With setting up transportation for pt. to get to and from his appt.'s?  He will need IV iron infusions in [facility].  PLease let us know if there's anything we can do to help facilitate this.    Thank you,    Adrian  ----- Message -----  From: Arnold Trinidad MD  Sent: 7/29/2020   2:33 PM CDT  To: Adrian Miranda RN, Gustavo Mai RN  Subject: RE: f/u regarding IV iron                        Hmm that is a thought. I think he would benefit from IV iron, as his anemia is pretty bad. Not sure how this works though with transportation.    Adrian, do you know how transportation works? See below messages for full information.    Thanks!Arnold  ----- Message -----  From: Gustavo Mai RN  Sent: 7/29/2020   1:37 PM CDT  To: Arnold Trinidad MD  Subject: RE: f/u regarding IV iron                        My only other thoughts would be Adrian or I can contact case management to see if maybe they can set up transportation  with [employer] or something similar organization.       ----- Message -----  From: Arnold Trinidad MD  Sent: 7/29/2020   1:33 PM CDT  To: Gustavo Mai RN  Subject: RE: f/u regarding IV iron                        Ok thanks for the update!    Arnold  ----- Message -----  From: Gustavo Mai RN  Sent: 7/29/2020  10:58 AM CDT  To: Arnold Trinidad MD  Subject: RE: f/u regarding IV iron                        I scheduled patient but  he didn't show for appointment. When I called Pt he reported that the lady that drive him just fell today. He is unable to make appointment and doesn't want to reschedule. He reports he will take oral Iron. Please let me know if there is anything else you need me to do.  Sorry I was unable to get patient in for his iron infusion.     Thanks Gustavo    ----- Message  -----  From: Arnold Trinidad MD  Sent: 7/27/2020  11:28 AM CDT  To: Adrian Miranda RN, Gustavo Mai RN  Subject: f/u regarding IV iron                            Good morning,    Gustavo, I see that you had tried calling him 1-2x in July to schedule the iron, but he had transportation issues. Would you mind following back up with him to schedule the IV iron? He had his endoscopy today, and gastroenterology asked me about getting him in for the IV iron.    Thanks so much!  Arnold

## 2020-09-21 NOTE — TELEPHONE ENCOUNTER
Pt. Confirmed audio visit with Dr. Trinidad at 11:40am on 11/23/20.    ----- Message from Arnold Trinidad MD sent at 9/21/2020 10:59 AM CDT -----  1. Schedule f/u virtual appt in 2 months.Thanks!

## 2020-09-21 NOTE — Clinical Note
Good morning,    Patient requested home health. I placed orders. Can you set him up with home health?    Thanks so much!

## 2020-09-22 ENCOUNTER — TELEPHONE (OUTPATIENT)
Dept: HEMATOLOGY/ONCOLOGY | Facility: CLINIC | Age: 85
End: 2020-09-22

## 2020-09-22 NOTE — TELEPHONE ENCOUNTER
Jasmina, with [facility], requests Dr. Trinidad be notified that nursing aid will not be available for several weeks but that OT will help pt. And family to assist pt. With ADL's.  Assured her that Dr. Trinidad will be notified.    ----- Message from Leighton Mosquera sent at 9/22/2020 10:46 AM CDT -----  Type:  Needs Medical Advice    Who Called:  [facility]  Reason:Has a question about part of his order   Would the patient rather a call back or a response via [facility]?call  Best Call Back Number: Jasmina [phone]  Additional Information: none

## 2020-09-25 PROCEDURE — G0180 MD CERTIFICATION HHA PATIENT: HCPCS | Mod: ,,, | Performed by: INTERNAL MEDICINE

## 2020-09-25 PROCEDURE — G0180 PR HOME HEALTH MD CERTIFICATION: ICD-10-PCS | Mod: ,,, | Performed by: INTERNAL MEDICINE

## 2020-10-01 ENCOUNTER — DOCUMENT SCAN (OUTPATIENT)
Dept: HOME HEALTH SERVICES | Facility: HOSPITAL | Age: 85
End: 2020-10-01
Payer: OTHER GOVERNMENT

## 2020-10-09 ENCOUNTER — EXTERNAL HOME HEALTH (OUTPATIENT)
Dept: HOME HEALTH SERVICES | Facility: HOSPITAL | Age: 85
End: 2020-10-09
Payer: MEDICARE

## 2020-11-12 ENCOUNTER — LAB VISIT (OUTPATIENT)
Dept: LAB | Facility: HOSPITAL | Age: 85
End: 2020-11-12
Attending: INTERNAL MEDICINE
Payer: MEDICARE

## 2020-11-12 DIAGNOSIS — D50.0 IRON DEFICIENCY ANEMIA SECONDARY TO BLOOD LOSS (CHRONIC): Primary | ICD-10-CM

## 2020-11-12 LAB
BASOPHILS # BLD AUTO: 0.02 K/UL (ref 0–0.2)
BASOPHILS NFR BLD: 0.3 % (ref 0–1.9)
DIFFERENTIAL METHOD: ABNORMAL
EOSINOPHIL # BLD AUTO: 0.3 K/UL (ref 0–0.5)
EOSINOPHIL NFR BLD: 3.5 % (ref 0–8)
ERYTHROCYTE [DISTWIDTH] IN BLOOD BY AUTOMATED COUNT: 13.8 % (ref 11.5–14.5)
FERRITIN SERPL-MCNC: 20 NG/ML (ref 20–300)
HCT VFR BLD AUTO: 40.5 % (ref 40–54)
HGB BLD-MCNC: 12.4 G/DL (ref 14–18)
IMM GRANULOCYTES # BLD AUTO: 0.03 K/UL (ref 0–0.04)
IMM GRANULOCYTES NFR BLD AUTO: 0.4 % (ref 0–0.5)
IRON SERPL-MCNC: 62 UG/DL (ref 45–160)
LYMPHOCYTES # BLD AUTO: 1.6 K/UL (ref 1–4.8)
LYMPHOCYTES NFR BLD: 20 % (ref 18–48)
MCH RBC QN AUTO: 25.8 PG (ref 27–31)
MCHC RBC AUTO-ENTMCNC: 30.6 G/DL (ref 32–36)
MCV RBC AUTO: 84 FL (ref 82–98)
MONOCYTES # BLD AUTO: 0.6 K/UL (ref 0.3–1)
MONOCYTES NFR BLD: 7.6 % (ref 4–15)
NEUTROPHILS # BLD AUTO: 5.3 K/UL (ref 1.8–7.7)
NEUTROPHILS NFR BLD: 68.2 % (ref 38–73)
NRBC BLD-RTO: 0 /100 WBC
PLATELET # BLD AUTO: 280 K/UL (ref 150–350)
PMV BLD AUTO: 11.8 FL (ref 9.2–12.9)
RBC # BLD AUTO: 4.81 M/UL (ref 4.6–6.2)
SATURATED IRON: 13 % (ref 20–50)
TOTAL IRON BINDING CAPACITY: 494 UG/DL (ref 250–450)
TRANSFERRIN SERPL-MCNC: 334 MG/DL (ref 200–375)
WBC # BLD AUTO: 7.8 K/UL (ref 3.9–12.7)

## 2020-11-12 PROCEDURE — 83540 ASSAY OF IRON: CPT

## 2020-11-12 PROCEDURE — 82728 ASSAY OF FERRITIN: CPT

## 2020-11-12 PROCEDURE — 85025 COMPLETE CBC W/AUTO DIFF WBC: CPT

## 2020-11-20 ENCOUNTER — TELEPHONE (OUTPATIENT)
Dept: INTERNAL MEDICINE | Facility: CLINIC | Age: 85
End: 2020-11-20

## 2020-11-20 NOTE — TELEPHONE ENCOUNTER
"----- Message from Emily De Jesus sent at 2020 11:06 AM CST -----  Contact: [facility]  Kemar Perez  MRN: [id number]  : 1934  PCP: Jade Matute  Home Phone      [phone]  Work Phone      Not on file.  Mobile          [phone]      MESSAGE:    Would like to speak to nurse about [facility] and getting his medications arranged with "[facility]ita Jarrett". Please call to discuss.      [phone]    "

## 2020-11-20 NOTE — TELEPHONE ENCOUNTER
Contacted [facility] pharmacy to get this set up. They will contact patient and set up an appointment to set up the rider packs for him

## 2020-11-22 NOTE — PROGRESS NOTES
PATIENT: Kemar Perez  MRN: [id number]  DATE: 11/22/2020    Diagnosis:   1. Iron deficiency anemia due to chronic blood loss    2. 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 Chronic diastolic CHF (congestive heart failure)    4. Atherosclerosis of aorta      Chief Complaint: iron deficiency anemia    Subjective:    History of Present Illness: Mr. Perez is a 86 y.o. male who presented in November 2019 for evaluation and management of iron deficiency anemia. He was referred by gastroenterologist Dr. Cohen.    Telemedicine visit:  The patient location is: home  The chief complaint leading to consultation is: iron deficiency anemia    Visit type: audio only    Face to Face time with patient: 15 minutes  25 minutes of total time spent on the encounter, which includes face to face time and non-face to face time preparing to see the patient (eg, review of tests), Obtaining and/or reviewing separately obtained history, Documenting clinical information in the electronic or other health record, Independently interpreting results (not separately reported) and communicating results to the patient/family/caregiver, or Care coordination (not separately reported).     Each patient to whom he or she provides medical services by telemedicine is:  (1) informed of the relationship between the physician and patient and the respective role of any other health care provider with respect to management of the patient; and (2) notified that he or she may decline to receive medical services by telemedicine and may withdraw from such care at any time.    Notes: see below    - labs dating back to January 2018 reveal a moderate normocytic anemia. Iron studies reveal a decreased ferritin/iron/saturated iron.  - it appears that he likely has angioectasias in his small intestines per his most recent note by Dr. Cohen (10/9/19).  - since April 2019, he has received 5 units of blood due to recurrent, symptomatic anemia.  - he  received ferric carboxymaltose on 11/13/19 and 11/20/19.  - He states he is unable to come in for labs/appointment due to transportation issues.     Interval history:  - he presents for a follow-up appointment for his iron deficiency anemia.  - today, he is doing well. He is taking oral iron daily.  - he reports improvement in his fatigue, dyspnea upon exertion, generalized weakness. He denies chest pain, nausea, vomiting, diarrhea, constipation.    Past medical, surgical, family, and social histories have been reviewed and updated below.    Past Medical History:   Past Medical History:   Diagnosis Date    Anemia     Aortic stenosis     Arthritis     AS (aortic stenosis)     Asthma     BBB (bundle branch block)     Cataract     COPD (chronic obstructive pulmonary disease)     Coronary artery disease     Encounter for blood transfusion     GERD (gastroesophageal reflux disease)     GI bleed     H/O food poisoning     H/O: GI bleed     Heart block AV second degree     Hemorrhoids     History of shingles     History of stomach ulcers     HLD (hyperlipidemia)     Hx of migraines     Hx of migraines     Hypertension     Macular degeneration of right eye     PAF (paroxysmal atrial fibrillation)     Pneumonia        Past Surgical History:   Past Surgical History:   Procedure Laterality Date    CARDIAC ANGIOGRAM WITH STENTS      CARDIAC PACEMAKER PLACEMENT      CATARACT EXTRACTION      CATARACT SX Bilateral     COLONOSCOPY N/A 4/9/2019    Procedure: COLONOSCOPY;  Surgeon: Sebastien Worthy MD;  Location: [facility];  Service: Endoscopy;  Laterality: N/A;    CORONARY ARTERY BYPASS GRAFT      CTR Right     ESOPHAGOGASTRODUODENOSCOPY N/A 4/8/2019    Procedure: EGD (ESOPHAGOGASTRODUODENOSCOPY);  Surgeon: Sebastien Worthy MD;  Location: [facility];  Service: Endoscopy;  Laterality: N/A;    ESOPHAGOGASTRODUODENOSCOPY N/A 10/17/2019    Procedure: EGD (ESOPHAGOGASTRODUODENOSCOPY);   Surgeon: Fabrice Helm MD;  Location: [facility] ENDO;  Service: Endoscopy;  Laterality: N/A;  NPO, bed, 2uprbc since last night    JOINT REPLACEMENT      SMALL BOWEL ENTEROSCOPY N/A 7/27/2020    Procedure: ENTEROSCOPY, upper sbe with gastric ph;  Surgeon: Loco Romo MD;  Location: [facility] ENDO;  Service: Endoscopy;  Laterality: N/A;    TOTAL KNEE ARTHROPLASTY Right     WRIST JOINT REMOVAL Right        Family History:   Family History   Problem Relation Age of Onset    Heart disease Paternal Grandmother     Hypertension Paternal Grandmother     Heart disease Paternal Grandfather     Hypertension Paternal Grandfather     Heart disease Father     Hypertension Father        Social History:  reports that he quit smoking about 37 years ago. His smoking use included cigarettes. He started smoking about 68 years ago. He smoked 1.00 pack per day. He has never used smokeless tobacco. He reports that he does not drink alcohol or use drugs.    Allergies:  Review of patient's allergies indicates:   Allergen Reactions    Dexamethasone Other (See Comments)     High blood pressure    Mobic [meloxicam] Other (See Comments)     Bleeds easily    Nsaids (non-steroidal anti-inflammatory drug) Other (See Comments)     Bleeding     Spiriva respimat [tiotropium bromide] Other (See Comments)     Dry mouth       Medications:  Current Outpatient Medications   Medication Sig Dispense Refill    acetaminophen (TYLENOL) 500 MG tablet Take 500 mg by mouth every 6 (six) hours as needed for Pain.      albuterol-ipratropium (DUO-NEB) 2.5 mg-0.5 mg/3 mL nebulizer solution Use 1 vial per NEBULIZER EVERY 6 HOURS AS NEEDED for wheezing 180 mL 3    ALPRAZolam (XANAX) 0.5 MG tablet TAKE ONE TABLET BY MOUTH TWICE DAILY AS NEEDED FOR ANXIETY 60 tablet 0    aspirin (ECOTRIN) 81 MG EC tablet Take 1 tablet (81 mg total) by mouth once daily. 90 tablet 3    cyanocobalamin 1,000 mcg/mL injection Inject 1 mL (1,000 mcg total) into the muscle  every 28 days. 10 mL 3    cyanocobalamin, vitamin B-12, 1,000 mcg/mL Kit INJECT ONE ML INTO THE MUSCLES EVERY MONTH 1 kit 11    FERROCITE 324 mg (106 mg iron) Tab TAKE ONE HALF TABLET BY MOUTH EVERY MORNING 30 tablet 0    furosemide (LASIX) 40 MG tablet Take 40 mg by mouth once daily.      hydroCHLOROthiazide (HYDRODIURIL) 25 MG tablet Take 25 mg by mouth once daily.       isosorbide mononitrate (IMDUR) 60 MG 24 hr tablet Take 60 mg by mouth once daily.       metoprolol succinate (TOPROL-XL) 25 MG 24 hr tablet Take 25 mg by mouth once daily.  11    pantoprazole (PROTONIX) 40 MG tablet Take 40 mg by mouth once daily.      potassium chloride SA (K-DUR,KLOR-CON) 20 MEQ tablet Take 20 mEq by mouth 2 (two) times daily.  11    rOPINIRole (REQUIP) 1 MG tablet ropinirole 1 mg tablet QHS 30 tablet 5    rosuvastatin (CRESTOR) 20 MG tablet Take 20 mg by mouth once daily.   11    traZODone (DESYREL) 100 MG tablet TAKE TWO TABLETS BY MOUTH EVERY EVENING 30 tablet 5    TRELEGY ELLIPTA 100-62.5-25 mcg DsDv Take 1 puff by mouth once daily. 30 each 11    VENTOLIN HFA 90 mcg/actuation inhaler INHALE 2 puffs into the lungs EVERY 6 HOURS AS NEEDED for wheezing 18 g 3     No current facility-administered medications for this visit.        Review of Systems   Constitutional: Positive for fatigue (improved).   HENT: Negative for sore throat.    Eyes: Negative for visual disturbance.   Respiratory: Positive for shortness of breath (improved).    Cardiovascular: Negative for chest pain.   Gastrointestinal: Negative for abdominal pain and blood in stool.   Genitourinary: Negative for dysuria.   Musculoskeletal: Negative for back pain.   Skin: Negative for rash.   Neurological: Positive for weakness (improved). Negative for headaches.   Hematological: Negative for adenopathy.   Psychiatric/Behavioral: The patient is not nervous/anxious.        ECOG Performance Status:   ECOG SCORE 1     Objective:      Vitals:   There were no  vitals filed for this visit.  BMI: There is no height or weight on file to calculate BMI.  Deferred since telemedicine visit.    Physical Exam   Deferred since telemedicine visit.    Laboratory Data:  Labs have been reviewed.    Lab Results   Component Value Date    WBC 7.80 11/12/2020    HGB 12.4 (L) 11/12/2020    HCT 40.5 11/12/2020    MCV 84 11/12/2020     11/12/2020         Imaging:     Upper GI endoscopy (10/17/19):  - Small hiatal hernia.  - Normal stomach.  - Normal examined duodenum.  - No specimens collected.    Colonoscopy (4/9/19):  - Diverticulosis in the sigmoid colon.  - The examination was otherwise normal.  - No specimens collected.    CT abdomen/pelvis (10/16/19):  The visualized portion of the base of the lungs is significant for mild bilateral basilar atelectatic versus fibrotic change.  The visualized portion of the heart is significant for calcification of the right coronary artery.  The stomach and spleen are unremarkable.  There is fatty atrophy of the pancreas.  The liver, gallbladder, and adrenal glands are unremarkable.  The visualized portion of the aorta is significant for extensive scattered plaque which extends into its branches.  The left kidney contains a subcentimeter hypodensity too small to characterize.  The right kidney is unremarkable.  The bladder is unremarkable.  The bowel is unremarkable.  The appendix has a normal appearance.  The osseous structures demonstrate degenerative change    Assessment:       1. Iron deficiency anemia due to chronic blood loss    2. 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 Chronic diastolic CHF (congestive heart failure)    4. Atherosclerosis of aorta         Plan:     1. Iron deficiency anemia due to chronic blood loss  - I have reviewed his labs  - labs dating back to January 2018 reveal a moderate normocytic anemia. Iron studies reveal a decreased ferritin/iron/saturated iron. These findings are consistent with iron  deficiency anemia.  - given the severity of his anemia and iron deficiency, I recommended ferric carboxymaltose x 2 doses.  - he received ferric carboxymaltose on 11/13/19 and 11/20/19.  - he is taking oral iron.  - I offered to schedule him for more ferric carboxymaltose at a previous visit, but he declined. He states he is unable to come here due to transportation issues. I stated that we can set up transportation for him, but he declined.  - Labs have been reviewed. Hemoglobin has improved to 12.4 g/dL. Iron indices are somewhat better as well.  - return to clinic in three months with repeat iron studies.    2. Angiodysplasia of duodenum  - Dr. Cohen feels his iron deficiency is secondary to angiodysplasias in his small intestines  - he denies recent bleeding. His anemia has improved, somewhat suggesting that there has been less bleeding as well.  - defer further management to gastroenterology    3. Atherosclerosis of aorta  - seen on CT imaging (2019)  - continue aspirin, rosuvastatin, metoprolol  - continue to monitor    4. Advance Care Planning     Power of   After our discussion (at previous visit), the patient decided to complete a HCPOA and appointed his friend John Wei ([phone]).           - return to clinic in three months with repeat iron studies.    Arnold Trinidad M.D.  Hematology/Oncology  [facility] - [address], [address]  [place] [address]  Phone: [phone]  Fax: [phone]

## 2020-11-23 ENCOUNTER — OFFICE VISIT (OUTPATIENT)
Dept: HEMATOLOGY/ONCOLOGY | Facility: CLINIC | Age: 85
End: 2020-11-23
Payer: MEDICARE

## 2020-11-23 DIAGNOSIS — I50.32 CHRONIC DIASTOLIC CHF (CONGESTIVE HEART FAILURE): ICD-10-CM

## 2020-11-23 DIAGNOSIS — D50.0 IRON DEFICIENCY ANEMIA DUE TO CHRONIC BLOOD LOSS: Primary | ICD-10-CM

## 2020-11-23 DIAGNOSIS — I70.0 ATHEROSCLEROSIS OF AORTA: ICD-10-CM

## 2020-11-23 DIAGNOSIS — K31.811 DUODENAL HEMORRHAGE DUE TO ANGIODYSPLASIA OF DUODENUM: ICD-10-CM

## 2020-11-23 PROCEDURE — 99443 PR PHYSICIAN TELEPHONE EVALUATION 21-30 MIN: ICD-10-PCS | Mod: 95,,, | Performed by: INTERNAL MEDICINE

## 2020-11-23 PROCEDURE — 99443 PR PHYSICIAN TELEPHONE EVALUATION 21-30 MIN: CPT | Mod: 95,,, | Performed by: INTERNAL MEDICINE

## 2020-11-23 NOTE — Clinical Note
1. Schedule CBC, ferritin, iron/TIBC with home health in 3 months (I think we arranged for this previously, not sure how we did it).  2. Schedule virtual audio visit 2-3 days after labs in 3 months.    Thanks!

## 2020-11-25 ENCOUNTER — TELEPHONE (OUTPATIENT)
Dept: HEMATOLOGY/ONCOLOGY | Facility: CLINIC | Age: 85
End: 2020-11-25

## 2020-11-25 DIAGNOSIS — D50.0 IRON DEFICIENCY ANEMIA DUE TO CHRONIC BLOOD LOSS: Primary | ICD-10-CM

## 2020-11-25 NOTE — TELEPHONE ENCOUNTER
8:39am:  [facility] staff, Char, confirmed pt. To be discharged from services week of 11/30/20.    8:50am:  LM for pt. To confirm lab appt. In [place] on 2/22/20 and audio visit with Dr. Trinidad at 1pm on 2/24/20.    8:51am:  Unable to lm for pt. To confirm lab appt. In [place] on 2/22/20 and audio visit with Dr. Trinidad at 1pm on 2/24/20

## 2021-01-26 ENCOUNTER — TELEPHONE (OUTPATIENT)
Dept: INTERNAL MEDICINE | Facility: CLINIC | Age: 86
End: 2021-01-26

## 2021-01-26 DIAGNOSIS — F41.9 ANXIETY: ICD-10-CM

## 2021-01-26 DIAGNOSIS — E78.5 HYPERLIPIDEMIA, UNSPECIFIED HYPERLIPIDEMIA TYPE: ICD-10-CM

## 2021-01-26 DIAGNOSIS — I10 ESSENTIAL HYPERTENSION: Primary | ICD-10-CM

## 2021-01-26 RX ORDER — ALPRAZOLAM 0.5 MG/1
TABLET ORAL
Qty: 60 TABLET | Refills: 0 | Status: SHIPPED | OUTPATIENT
Start: 2021-01-26

## 2021-01-26 RX ORDER — FERROUS FUMARATE 324(106)MG
TABLET ORAL
Qty: 30 TABLET | Refills: 0 | Status: SHIPPED | OUTPATIENT
Start: 2021-01-26

## 2021-03-02 ENCOUNTER — OUTPATIENT CASE MANAGEMENT (OUTPATIENT)
Dept: ADMINISTRATIVE | Facility: OTHER | Age: 86
End: 2021-03-02

## 2021-06-05 PROBLEM — K92.2 INTESTINAL BLEEDING: Status: ACTIVE | Noted: 2021-06-05

## 2021-06-06 PROBLEM — N17.9 AKI (ACUTE KIDNEY INJURY): Status: ACTIVE | Noted: 2021-06-06

## 2021-06-06 PROBLEM — R10.9 ABDOMINAL PAIN: Status: ACTIVE | Noted: 2019-10-09

## 2021-07-08 NOTE — TELEPHONE ENCOUNTER
----- Message from Kate Randolph LPN sent at 12/27/2019 10:53 AM CST -----  Regarding: FW: External referral      ----- Message -----  From: Ankita Centeno  Sent: 12/27/2019  10:17 AM CST  To: Demetrius Adan Staff  Subject: External referral                                Good morning,    Patient is being referred to GI for iron deficiency anemia. Upon further review of chart, patient was seen by Dr. Cohen in October for abdominal pain. Patient was admitted several days later for GI bleed with profound anemia and abdominal pain. GI suggested patient undergo SBE with Dr. Romo. Patient is also being followed by Hem Onc. Referral and records scanned into .    Thank you,  Ankita  
Messages placed on pts' home and cell phones.  Attempt to schedule appt.   
13-Jul-2021

## 2021-08-28 ENCOUNTER — NURSE TRIAGE (OUTPATIENT)
Dept: ADMINISTRATIVE | Facility: CLINIC | Age: 86
End: 2021-08-28

## 2022-02-03 ENCOUNTER — HOSPITAL ENCOUNTER (EMERGENCY)
Facility: HOSPITAL | Age: 87
Discharge: HOME | End: 2022-02-03
Attending: STUDENT IN AN ORGANIZED HEALTH CARE EDUCATION/TRAINING PROGRAM
Payer: COMMERCIAL

## 2022-02-03 VITALS
RESPIRATION RATE: 18 BRPM | SYSTOLIC BLOOD PRESSURE: 132 MMHG | BODY MASS INDEX: 18.37 KG/M2 | OXYGEN SATURATION: 99 % | TEMPERATURE: 98 F | WEIGHT: 128 LBS | DIASTOLIC BLOOD PRESSURE: 75 MMHG | HEART RATE: 79 BPM

## 2022-02-03 DIAGNOSIS — S22.32XA CLOSED FRACTURE OF ONE RIB OF LEFT SIDE, INITIAL ENCOUNTER: ICD-10-CM
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
## 2022-02-03 DIAGNOSIS — M54.2 NECK PAIN, ACUTE: ICD-10-CM
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
## 2022-02-03 PROCEDURE — 63600175 PHARM REV CODE 636 W HCPCS: Performed by: STUDENT IN AN ORGANIZED HEALTH CARE EDUCATION/TRAINING PROGRAM

## 2022-02-03 PROCEDURE — 99285 EMERGENCY DEPT VISIT HI MDM: CPT | Mod: 25

## 2022-02-03 PROCEDURE — 25000003 PHARM REV CODE 250: Performed by: STUDENT IN AN ORGANIZED HEALTH CARE EDUCATION/TRAINING PROGRAM

## 2022-02-03 PROCEDURE — 96374 THER/PROPH/DIAG INJ IV PUSH: CPT

## 2022-02-03 RX ORDER — MORPHINE SULFATE 4 MG/ML
4 INJECTION, SOLUTION INTRAMUSCULAR; INTRAVENOUS
Status: COMPLETED | OUTPATIENT
Start: 2022-02-03 | End: 2022-02-03

## 2022-02-03 RX ORDER — HYDROCODONE BITARTRATE AND ACETAMINOPHEN 5; 325 MG/1; MG/1
1 TABLET ORAL EVERY 4 HOURS PRN
Qty: 10 TABLET | Refills: 0 | Status: SHIPPED | OUTPATIENT
Start: 2022-02-03

## 2022-02-03 RX ORDER — HYDROCODONE BITARTRATE AND ACETAMINOPHEN 5; 325 MG/1; MG/1
1 TABLET ORAL
Status: COMPLETED | OUTPATIENT
Start: 2022-02-03 | End: 2022-02-03

## 2022-02-03 RX ADMIN — MORPHINE SULFATE 4 MG: 4 INJECTION INTRAVENOUS at 10:02

## 2022-02-03 RX ADMIN — HYDROCODONE BITARTRATE AND ACETAMINOPHEN 1 TABLET: 5; 325 TABLET ORAL at 11:02

## 2022-02-04 NOTE — DISCHARGE INSTRUCTIONS
Please take Tylenol for any body aches or pain  Please follow-up with your primary doctor tomorrow or Monday for develop any weakness headache confusion please immediately return to the ED
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
## 2024-06-12 NOTE — PROGRESS NOTES
Received consult from Dr. Trinidad re: arranging home health services for patient. Orders include skilled nursing assessments, monthly lab draws, PT, OT, and aide visits. Per chart, patient has recently had services through [facility]. Called patient and left voice mail messages at both numbers listed in the chart for him, [phone] and [phone]. Called [facility] liason nurse Ariana at ext. [phone] and provided referral information. She is able to access patient's chart in Epic and will coordinate the referral with the staff in the office at [facility], for a new admission and cert period. Patient had just been discharged from home health services on 9/8. Will continue to follow and assist as needs are identified.       [FreeTextEntry1] : Gait: Presents ambulating independently without signs of antalgia. Good coordination and balance noted.  GENERAL: alert, cooperative, in NAD SKIN: The skin is intact, warm, pink and dry over the area examined. EYES: Normal conjunctiva, normal eyelids and pupils were equal and round. ENT: normal ears, normal nose and normal lips. CARDIOVASCULAR: brisk capillary refill, but no peripheral edema. RESPIRATORY: The patient is in no apparent respiratory distress. They're taking full deep breaths without use of accessory muscles or evidence of audible wheezes or stridor without the use of a stethoscope. Normal respiratory effort. ABDOMEN: not examined  Right Upper Extremity: Short arm cast removed, tolerated procedure well Skin intact, expected dryness from casting No blisters or open lesions No clinical deformity Elbow/wrist ROM deferred due to stiffness from casting Wiggles fingers Sensation grossly intact WWP distally

## 2025-04-03 NOTE — DISCHARGE SUMMARY
Problem: Discharge Planning  Goal: Discharge to home or other facility with appropriate resources  Outcome: Progressing     Problem: Skin/Tissue Integrity  Goal: Skin integrity remains intact  Description: 1.  Monitor for areas of redness and/or skin breakdown  2.  Assess vascular access sites hourly  3.  Every 4-6 hours minimum:  Change oxygen saturation probe site  4.  Every 4-6 hours:  If on nasal continuous positive airway pressure, respiratory therapy assess nares and determine need for appliance change or resting period  Outcome: Progressing     Problem: Safety - Adult  Goal: Free from fall injury  Outcome: Progressing   Pt will remain free from falls throughout hospital stay. Fall precautions in place, bed alarm on, bed in lowest position with wheels locked and side rails 2/4 up. Room door open and hourly rounding completed. Will continue to monitor throughout shift.     Problem: Chronic Conditions and Co-morbidities  Goal: Patient's chronic conditions and co-morbidity symptoms are monitored and maintained or improved  Outcome: Progressing     Problem: Pain  Goal: Verbalizes/displays adequate comfort level or baseline comfort level  Outcome: Progressing   Pt will be satisfied with pain control. Pt uses numeric pain rating scale with reassessments after pain med administration. Will continue to monitor progression throughout shift.    [facility] Hospital Medicine  Discharge Summary      Patient Name: Kemar Perez  MRN: [id number]  Admission Date: 10/17/2019  Hospital Length of Stay: 2 days  Discharge Date and Time:  10/19/2019 3:31 PM  Attending Physician: Idalia Seth MD   Discharging Provider: Idalia Seth MD  Primary Care Provider: César Priest MD      HPI:   Kemar Perez is a 85 y.o. male that (in part)  has a past medical history of Adenomatous polyps, Anemia, Aortic stenosis, Arthritis, AS (aortic stenosis), Asthma, BBB (bundle branch block), Cataract, COPD (chronic obstructive pulmonary disease), Coronary artery disease, Encounter for blood transfusion, GERD (gastroesophageal reflux disease), GI bleed, H/O food poisoning, H/O: GI bleed, Heart block AV second degree, Hemorrhoids, History of shingles, History of stomach ulcers, HLD (hyperlipidemia), migraines, migraines, Hypertension, Macular degeneration of right eye, PAF (paroxysmal atrial fibrillation), and Pneumonia.  has a past surgical history that includes Coronary artery bypass graft; Joint replacement; Total knee arthroplasty (Right); CATARACT SX (Bilateral); CARDIAC ANGIOGRAM WITH STENTS; CTR (Right); WRIST JOINT REMOVAL (Right); Cardiac pacemaker placement; Cataract extraction; Esophagogastroduodenoscopy (N/A, 4/8/2019); and Colonoscopy (N/A, 4/9/2019). Presents to [facility] as a transfer patient from the [facility] where he presented with generalized fatigue and malaise associated with shortness of breath and dark stool.  Positive epigastric pain, weakness, fatigue, and lightheadedness.  This is a recurrent issue for him.  He had endoscopy in April of this year due to similar symptoms.  Is found to be anemic from an upper GI bleeding from angiodysplasia of the duodenum and ulcer disease. Denies fever chills.  Denies syncope, chest pain, unilateral weakness.  Denies hematemesis.    In the emergency  department routine laboratory studies revealed evidence of significant anemia with hemoglobin of 5.5 patient at time screen and was transfused 1 unit of PRBCs prior to transfer.  Multiple attempts were made to transfer the patient to nearby facility is, however GI services are not available.   Hospital medicine has been asked to admit for further evaluation and treatment, including evaluation by Gastroenterology.     Procedure(s) (LRB):  EGD (ESOPHAGOGASTRODUODENOSCOPY) (N/A)      Hospital Course:   Admitted for upper GI bleeding with anemia requiring transfusion. Received 2U RBCs on 10/17 with appropriate response. EGD 10/17 was not remarkable for source of bleeding. GI planning small bowel enteroscopy as outpatient. Hgb remains stable. He did have a dark stool, but it was not sticky like melena. Patient is feeling much better and asking to discharge. Stable for discharge to home. Follow up with GI for small bowel enteroscopy.      Consults:   Consults (From admission, onward)        Status Ordering Provider     Inpatient consult to Gastroenterology  Once     Provider:  Ad Burton MD    Completed ANAND HI          No new Assessment & Plan notes have been filed under this hospital service since the last note was generated.  Service: Hospital Medicine    Final Active Diagnoses:    Diagnosis Date Noted POA    PRINCIPAL PROBLEM:  Acute upper GI bleed [K92.2] 10/17/2019 Yes    Coronary artery disease involving native coronary artery of native heart without angina pectoris [I25.10] 10/18/2019 Yes    Chronic respiratory failure with hypoxia [J96.11] 10/18/2019 Yes    Essential hypertension [I10] 10/17/2019 Yes    Hyperlipidemia [E78.5] 10/17/2019 Yes    Melena [K92.1] 01/20/2018 Yes    Symptomatic anemia [D64.9] 01/19/2018 Yes    COPD (chronic obstructive pulmonary disease) [J44.9] 01/19/2018 Yes      Problems Resolved During this Admission:    Diagnosis Date Noted Date Resolved POA    Angina,  "class II [I20.9] 05/15/2017 10/18/2019 Yes       Discharged Condition: good    Disposition: Home or Self Care    Follow Up:  Follow-up Information     Sebastien Worthy MD On 10/24/2019.    Specialty:  Gastroenterology  Why:  Outpatient Services, hospital follow up appointment on Thursday at 1:00  Contact information:  [address] [place] [address] [facility], [facility]  [place] [address]  [phone]                 Patient Instructions:      WALKER FOR HOME USE     Order Specific Question Answer Comments   Type of Walker: Adult (5'4"-6'6")    With wheels? Yes    Height: 5' 11" (1.803 m)    Weight: 84 kg (185 lb 3 oz)    Length of need (1-99 months): 99    Does patient have medical equipment at home? bath bench    Does patient have medical equipment at home? bedside commode    Does patient have medical equipment at home? cane, straight    Does patient have medical equipment at home? oxygen    Does patient have medical equipment at home? grab bar    Please check all that apply: Patient's condition impairs ambulation.      Diet Cardiac     Notify your health care provider if you experience any of the following:  temperature >100.4     Notify your health care provider if you experience any of the following:  persistent nausea and vomiting or diarrhea     Notify your health care provider if you experience any of the following:  severe uncontrolled pain     Notify your health care provider if you experience any of the following:  redness, tenderness, or signs of infection (pain, swelling, redness, odor or green/yellow discharge around incision site)     Notify your health care provider if you experience any of the following:  difficulty breathing or increased cough     Notify your health care provider if you experience any of the following:  severe persistent headache     Notify your health care provider if you experience any of the following:  worsening rash     Notify your health care provider if you " experience any of the following:  persistent dizziness, light-headedness, or visual disturbances     Notify your health care provider if you experience any of the following:  increased confusion or weakness     Activity as tolerated       Significant Diagnostic Studies: Labs: All labs within the past 24 hours have been reviewed    Pending Diagnostic Studies:     None         Medications:  Reconciled Home Medications:      Medication List      CHANGE how you take these medications    furosemide 40 MG tablet  Commonly known as:  LASIX  Take 40 mg by mouth once daily.  What changed:  Another medication with the same name was removed. Continue taking this medication, and follow the directions you see here.     lisinopril 5 MG tablet  Commonly known as:  PRINIVILZESTRIL  Take 1 tablet (5 mg total) by mouth once daily.  What changed:    · medication strength  · how much to take        CONTINUE taking these medications    ALPRAZolam 0.5 MG tablet  Commonly known as:  XANAX  Take 0.5 mg by mouth 2 (two) times daily.     budesonide-formoterol 160-4.5 mcg 160-4.5 mcg/actuation Hfaa  Commonly known as:  SYMBICORT  Inhale 1 puff into the lungs every 12 (twelve) hours. Controller     cyanocobalamin (vitamin B-12) 1,000 mcg/mL Kit  cyanocobalamin (vit B-12) 1,000 mcg/mL injection solution   INJECT ONE ML INTO THE MUSCLES EVERY MONTH     isosorbide mononitrate 60 MG 24 hr tablet  Commonly known as:  IMDUR  Take 60 mg by mouth once daily.     metoprolol succinate 25 MG 24 hr tablet  Commonly known as:  TOPROL-XL  Take 25 mg by mouth once daily.     potassium chloride SA 20 MEQ tablet  Commonly known as:  K-DUR,KLOR-CON  Take 20 mEq by mouth 2 (two) times daily.     ranitidine 150 MG tablet  Commonly known as:  ZANTAC  Take 150 mg by mouth 2 (two) times daily.     rOPINIRole 1 MG tablet  Commonly known as:  REQUIP  ropinirole 1 mg tablet     rosuvastatin 20 MG tablet  Commonly known as:  CRESTOR  20 mg.     traZODone 100 MG  tablet  Commonly known as:  DESYREL  200 mg.        STOP taking these medications    hydroCHLOROthiazide 25 MG tablet  Commonly known as:  HYDRODIURIL            Indwelling Lines/Drains at time of discharge:   Lines/Drains/Airways     None                 Time spent on the discharge of patient: 35 minutes  Patient was seen and examined on the date of discharge and determined to be suitable for discharge.         Idalia Seth MD  Department of Hospital Medicine  [facility]